# Patient Record
Sex: MALE | Race: WHITE | URBAN - METROPOLITAN AREA
[De-identification: names, ages, dates, MRNs, and addresses within clinical notes are randomized per-mention and may not be internally consistent; named-entity substitution may affect disease eponyms.]

---

## 2021-01-07 ENCOUNTER — INPATIENT (INPATIENT)
Facility: HOSPITAL | Age: 86
LOS: 6 days | Discharge: NURSING, REHAB OR LONG-TERM CARE | DRG: 281 | End: 2021-01-14
Attending: HOSPITALIST | Admitting: HOSPITALIST
Payer: MEDICARE

## 2021-01-07 VITALS
OXYGEN SATURATION: 98 % | RESPIRATION RATE: 18 BRPM | HEIGHT: 69 IN | WEIGHT: 229.94 LBS | DIASTOLIC BLOOD PRESSURE: 109 MMHG | TEMPERATURE: 98 F | HEART RATE: 73 BPM | SYSTOLIC BLOOD PRESSURE: 193 MMHG

## 2021-01-07 LAB
ALBUMIN SERPL ELPH-MCNC: 4 G/DL — SIGNIFICANT CHANGE UP (ref 3.4–5)
ALP SERPL-CCNC: 99 U/L — SIGNIFICANT CHANGE UP (ref 40–120)
ALT FLD-CCNC: 21 U/L — SIGNIFICANT CHANGE UP (ref 12–42)
ANION GAP SERPL CALC-SCNC: 12 MMOL/L — SIGNIFICANT CHANGE UP (ref 9–16)
APPEARANCE UR: SIGNIFICANT CHANGE UP
APTT BLD: 29.5 SEC — SIGNIFICANT CHANGE UP (ref 27.5–35.5)
AST SERPL-CCNC: 26 U/L — SIGNIFICANT CHANGE UP (ref 15–37)
BASOPHILS # BLD AUTO: 0 K/UL — SIGNIFICANT CHANGE UP (ref 0–0.2)
BASOPHILS NFR BLD AUTO: 0 % — SIGNIFICANT CHANGE UP (ref 0–2)
BILIRUB SERPL-MCNC: 0.8 MG/DL — SIGNIFICANT CHANGE UP (ref 0.2–1.2)
BILIRUB UR-MCNC: NEGATIVE — SIGNIFICANT CHANGE UP
BUN SERPL-MCNC: 52 MG/DL — HIGH (ref 7–23)
CALCIUM SERPL-MCNC: 9.2 MG/DL — SIGNIFICANT CHANGE UP (ref 8.5–10.5)
CHLORIDE SERPL-SCNC: 112 MMOL/L — HIGH (ref 96–108)
CO2 SERPL-SCNC: 24 MMOL/L — SIGNIFICANT CHANGE UP (ref 22–31)
COLOR SPEC: YELLOW — SIGNIFICANT CHANGE UP
CREAT SERPL-MCNC: 1.67 MG/DL — HIGH (ref 0.5–1.3)
DIFF PNL FLD: ABNORMAL
EOSINOPHIL # BLD AUTO: 0.14 K/UL — SIGNIFICANT CHANGE UP (ref 0–0.5)
EOSINOPHIL NFR BLD AUTO: 1 % — SIGNIFICANT CHANGE UP (ref 0–6)
ETHANOL SERPL-MCNC: <3 MG/DL — SIGNIFICANT CHANGE UP
GLUCOSE SERPL-MCNC: 168 MG/DL — HIGH (ref 70–99)
GLUCOSE UR QL: NEGATIVE — SIGNIFICANT CHANGE UP
HCT VFR BLD CALC: 42.6 % — SIGNIFICANT CHANGE UP (ref 39–50)
HGB BLD-MCNC: 14.2 G/DL — SIGNIFICANT CHANGE UP (ref 13–17)
INR BLD: 1.09 — SIGNIFICANT CHANGE UP (ref 0.88–1.16)
KETONES UR-MCNC: NEGATIVE — SIGNIFICANT CHANGE UP
LACTATE SERPL-SCNC: 1.2 MMOL/L — SIGNIFICANT CHANGE UP (ref 0.4–2)
LACTATE SERPL-SCNC: 2.2 MMOL/L — HIGH (ref 0.4–2)
LEUKOCYTE ESTERASE UR-ACNC: NEGATIVE — SIGNIFICANT CHANGE UP
LYMPHOCYTES # BLD AUTO: 0.14 K/UL — LOW (ref 1–3.3)
LYMPHOCYTES # BLD AUTO: 1 % — LOW (ref 13–44)
MCHC RBC-ENTMCNC: 31.3 PG — SIGNIFICANT CHANGE UP (ref 27–34)
MCHC RBC-ENTMCNC: 33.3 GM/DL — SIGNIFICANT CHANGE UP (ref 32–36)
MCV RBC AUTO: 94 FL — SIGNIFICANT CHANGE UP (ref 80–100)
MONOCYTES # BLD AUTO: 0.84 K/UL — SIGNIFICANT CHANGE UP (ref 0–0.9)
MONOCYTES NFR BLD AUTO: 6 % — SIGNIFICANT CHANGE UP (ref 2–14)
NEUTROPHILS # BLD AUTO: 12.83 K/UL — HIGH (ref 1.8–7.4)
NEUTROPHILS NFR BLD AUTO: 92 % — HIGH (ref 43–77)
NITRITE UR-MCNC: NEGATIVE — SIGNIFICANT CHANGE UP
NRBC # BLD: SIGNIFICANT CHANGE UP /100 WBCS (ref 0–0)
NT-PROBNP SERPL-SCNC: HIGH PG/ML
PH UR: 5.5 — SIGNIFICANT CHANGE UP (ref 5–8)
PLATELET # BLD AUTO: 184 K/UL — SIGNIFICANT CHANGE UP (ref 150–400)
POTASSIUM SERPL-MCNC: 3.8 MMOL/L — SIGNIFICANT CHANGE UP (ref 3.5–5.3)
POTASSIUM SERPL-SCNC: 3.8 MMOL/L — SIGNIFICANT CHANGE UP (ref 3.5–5.3)
PROT SERPL-MCNC: 8 G/DL — SIGNIFICANT CHANGE UP (ref 6.4–8.2)
PROT UR-MCNC: 100 MG/DL
PROTHROM AB SERPL-ACNC: 12.8 SEC — SIGNIFICANT CHANGE UP (ref 10.6–13.6)
RBC # BLD: 4.53 M/UL — SIGNIFICANT CHANGE UP (ref 4.2–5.8)
RBC # FLD: 13.4 % — SIGNIFICANT CHANGE UP (ref 10.3–14.5)
SARS-COV-2 RNA SPEC QL NAA+PROBE: SIGNIFICANT CHANGE UP
SODIUM SERPL-SCNC: 148 MMOL/L — HIGH (ref 132–145)
SP GR SPEC: 1.02 — SIGNIFICANT CHANGE UP (ref 1–1.03)
TROPONIN I SERPL-MCNC: 0.24 NG/ML — HIGH (ref 0.02–0.06)
TROPONIN I SERPL-MCNC: 0.4 NG/ML — HIGH (ref 0.02–0.06)
UROBILINOGEN FLD QL: 0.2 E.U./DL — SIGNIFICANT CHANGE UP
WBC # BLD: 13.95 K/UL — HIGH (ref 3.8–10.5)
WBC # FLD AUTO: 13.95 K/UL — HIGH (ref 3.8–10.5)

## 2021-01-07 PROCEDURE — 71045 X-RAY EXAM CHEST 1 VIEW: CPT | Mod: 26

## 2021-01-07 PROCEDURE — 99285 EMERGENCY DEPT VISIT HI MDM: CPT

## 2021-01-07 PROCEDURE — 70450 CT HEAD/BRAIN W/O DYE: CPT | Mod: 26,MH

## 2021-01-07 RX ORDER — AZITHROMYCIN 500 MG/1
500 TABLET, FILM COATED ORAL ONCE
Refills: 0 | Status: COMPLETED | OUTPATIENT
Start: 2021-01-07 | End: 2021-01-07

## 2021-01-07 RX ORDER — SODIUM CHLORIDE 9 MG/ML
1000 INJECTION INTRAMUSCULAR; INTRAVENOUS; SUBCUTANEOUS ONCE
Refills: 0 | Status: COMPLETED | OUTPATIENT
Start: 2021-01-07 | End: 2021-01-07

## 2021-01-07 RX ORDER — LABETALOL HCL 100 MG
100 TABLET ORAL ONCE
Refills: 0 | Status: DISCONTINUED | OUTPATIENT
Start: 2021-01-07 | End: 2021-01-07

## 2021-01-07 RX ORDER — LABETALOL HCL 100 MG
10 TABLET ORAL ONCE
Refills: 0 | Status: DISCONTINUED | OUTPATIENT
Start: 2021-01-07 | End: 2021-01-07

## 2021-01-07 RX ORDER — AMLODIPINE BESYLATE 2.5 MG/1
5 TABLET ORAL ONCE
Refills: 0 | Status: COMPLETED | OUTPATIENT
Start: 2021-01-07 | End: 2021-01-07

## 2021-01-07 RX ORDER — ASPIRIN/CALCIUM CARB/MAGNESIUM 324 MG
325 TABLET ORAL ONCE
Refills: 0 | Status: COMPLETED | OUTPATIENT
Start: 2021-01-07 | End: 2021-01-07

## 2021-01-07 RX ORDER — CEFTRIAXONE 500 MG/1
1000 INJECTION, POWDER, FOR SOLUTION INTRAMUSCULAR; INTRAVENOUS ONCE
Refills: 0 | Status: COMPLETED | OUTPATIENT
Start: 2021-01-07 | End: 2021-01-07

## 2021-01-07 RX ADMIN — SODIUM CHLORIDE 1000 MILLILITER(S): 9 INJECTION INTRAMUSCULAR; INTRAVENOUS; SUBCUTANEOUS at 16:52

## 2021-01-07 RX ADMIN — CEFTRIAXONE 100 MILLIGRAM(S): 500 INJECTION, POWDER, FOR SOLUTION INTRAMUSCULAR; INTRAVENOUS at 17:44

## 2021-01-07 RX ADMIN — AZITHROMYCIN 255 MILLIGRAM(S): 500 TABLET, FILM COATED ORAL at 23:15

## 2021-01-07 NOTE — ED PROVIDER NOTE - CARE PLAN
Principal Discharge DX:	Fall from standing, initial encounter  Secondary Diagnosis:	Alcoholic intoxication without complication

## 2021-01-07 NOTE — ED ADULT NURSE REASSESSMENT NOTE - NS ED NURSE REASSESS COMMENT FT1
Pt refusing to take blood pressure medication. Pt keeps stating, "I'm old. I'm old. I'm 33." Attempted to use [place] . Pt only repeating himself and yelling over . Safety and comfort measures maintained.

## 2021-01-07 NOTE — H&P ADULT - PROBLEM SELECTOR PLAN 3
SBP 190s/100s upon arrival to [facility], improved to 130s/90s after Norvasc 5mg PO x 1 dose and 1 liter urine retrieved from straight cath.  --will continue Norvasc 5mg PO daily. SBP 190s/100s upon arrival to [facility], improved to 130s/90s after Norvasc 5mg PO x 1 dose and 1 liter urine retrieved from straight cath.  --will start Metoprolol Tartrate 25mg PO BID.

## 2021-01-07 NOTE — H&P ADULT - MENTAL STATUS
alert and oriented x 1 (only to self), not to place or time, poor memory and concentration noted alert and oriented x 1 (only to self/date of birth), not to place or time, poor memory and concentration noted

## 2021-01-07 NOTE — H&P ADULT - NSHPLABSRESULTS_GEN_ALL_CORE
EKG SR with PAC's no acute ischemic changes EKG revealed NSR@97BPM with PVC noted, no acute ischemic changes. Initial EKG@[facility] SR@97BPM with PVC noted, no acute ischemic changes  Repeat EKG@[facility] SR@85BPM with PACs and biphasic TW V2-V3 consistent with Wellens sign

## 2021-01-07 NOTE — H&P ADULT - PROBLEM SELECTOR PLAN 2
Initial Troponin I 0.241 and second set 0.395.  --will obtain STAT Troponin T, CK, CKMB and repeat in AM.  --continue cardiac telemetry and obtain Echo in AM. Initial Troponin I 0.241 and second set 0.395.  --Patient chest pain free, denies cardiac history.  --will obtain STAT Troponin T, CK, CKMB and repeat in AM.  --continue cardiac telemetry and obtain Echo in AM. Initial EKG@[facility] without acute changes, repeat EKG@[facility] SR@85BPM with PACs and biphasic TW V2-V3 consistent with Wellens sign.   --Troponin I 0.241 and second set Troponin I 0.395.  --Troponin T 0.07, CKMB 9.7, , R/I NSTEMI, started on Heparin gtt per ACS protocol and loaded with Plavix 300mg PO x 1 dose.   --Continue medical management with ASA/Plavix/Statin.  --Obtain Echocardiogram in AM. Initial EKG@[facility] without acute changes, repeat EKG@[facility] SR@85BPM with PACs and biphasic TW V2-V3 consistent with Wellens sign.   --Troponin I 0.241 and second set Troponin I 0.395.  --Troponin T 0.07, CKMB 9.7, , R/I NSTEMI, started on Heparin gtt per ACS protocol and loaded with Plavix 300mg PO x 1 dose as discussed with CCU fellow Dr. Higgins.  --Continue medical management with ASA/Plavix/Statin.  --Obtain Echocardiogram in AM.

## 2021-01-07 NOTE — ED PROVIDER NOTE - PHYSICAL EXAMINATION
VITAL SIGNS: I have reviewed nursing notes and confirm.  CONSTITUTIONAL: Well-developed elderly man comfortable in stretcher, mildly disheveled smelling of alcohol, at times not following commands and at times speaking strongly in english making his demands known; in no acute distress.  SKIN: Skin exam is warm and dry, no acute rash.  HEAD: Normocephalic; atraumatic.  EYES: PERRL, EOM intact; conjunctiva and sclera clear.  ENT: No nasal discharge; airway clear.  NECK: Supple; non tender.  CARD: S1, S2 normal; no murmurs, gallops, or rubs. Regular rate and rhythm.  RESP: Unlabored. No wheezes, rales or rhonchi.  ABD: soft; non-distended; non-tender  EXT: Normal ROM. No cyanosis or edema. Non-ttp all ext, distal pulses intact, + poor foot hygiene   NEURO: Alert, oriented to self, no facial asymmetry, symmetric , gait deferred, no appreciable sensory deficits, non-cooperative with most of neuro exam  PSYCH: + intoxicated

## 2021-01-07 NOTE — ED ADULT NURSE NOTE - NSFALLRSKASSESSTYPE_ED_ALL_ED
Detail Level: Zone
Comment: Overall patient greatly improved although currently flaring today likely due to recent nail salon appointment.  States medications improve rash after a  few days. \\nDiscussed chronic and recurrent nature \\nDiscussed use of cerave cream
Initial (On Arrival)

## 2021-01-07 NOTE — H&P ADULT - PROBLEM SELECTOR PLAN 1
witnessed fall by bystanders on street, patient denies prodromal symptoms.   --EKG revealed SR with PACs, CT head negative for acute findings. witnessed fall by bystanders on street, patient denies prodromal symptoms.   --CT head negative for acute findings.

## 2021-01-07 NOTE — ED PROVIDER NOTE - CLINICAL SUMMARY MEDICAL DECISION MAKING FREE TEXT BOX
+ AMS, poor historian, meeting sepsis criteria, no clear source though not exhibiting signs of meningismus, spinal tap deferred at this time. Hemodynamically stable and suitable for RMF. Given pt's elevated BNP, high risk for acute pulmonary edema, so fluids given were less than 30cc/kg in order to protect pt's cardiopulmonary function.

## 2021-01-07 NOTE — ED ADULT NURSE NOTE - CHIEF COMPLAINT QUOTE
BIBA for a witnessed fall by bystanders as per EMS. Pt appears confused, denies alcohol use. No signs of trauma. Pt ambulates with a walker. Per EMS, pt could barely ambulate in the field.

## 2021-01-07 NOTE — H&P ADULT - PROBLEM SELECTOR PLAN 4
BUN/Cr 52/1.67, baseline Cr unknown.  --UA large blood, >10 RBC, bacteria present, negative nitrite/LE. Will obtain Urine electrolytes.  --Noted to have urinary retention at [facility] with 1L retrieved during straight cath, will repeat bladder scan.

## 2021-01-07 NOTE — ED PROVIDER NOTE - ATTENDING CONTRIBUTION TO CARE
I have seen the pt, reviewed all pertinent clinical data, and I agree with the documentation/care/plan executed by PARRISH Cox.

## 2021-01-07 NOTE — H&P ADULT - PROBLEM SELECTOR PLAN 5
afebrile, WBC 13.95 with slight left shift, Lactate 2.2 with improvement 1.2 after IVFs.  --CXR without acute infiltrates, UA large blood, >10 RBC, bacteria present, negative nitrite/LE, Blood cultures pending. afebrile, WBC 13.95 with slight left shift, Lactate 2.2 with improvement 1.2 after IVFs.  --CXR without acute infiltrates, COVID PCR negative, UA: large blood, >10 RBC, bacteria present, negative nitrite/LE and Blood cultures drawn-- results pending.  --received Azithromycin 500mg IV x 1 dose and Ceftriaxone 1g IV x 1 dose at [facility], will continue to monitor for now and discuss need for continuation of antibiotics in AM.

## 2021-01-07 NOTE — ED ADULT TRIAGE NOTE - CHIEF COMPLAINT QUOTE
BIBA for a witness fall by bystanders as per EMS. Pt appears confused, denies alcohol use. No signs of trauma. Pt ambulates with a walker. Per EMS, pt could barely ambulate in the field. BIBA for a witnessed fall by bystanders as per EMS. Pt appears confused, denies alcohol use. No signs of trauma. Pt ambulates with a walker. Per EMS, pt could barely ambulate in the field.

## 2021-01-07 NOTE — ED ADULT NURSE REASSESSMENT NOTE - NS ED NURSE REASSESS COMMENT FT1
Pt straight-cath for urinalysis using sterile technique. Pt tolerated procedure well, 1000ml of urine retrieved, jeff colored. Sample sent. Pt A&Ox1, oriented to person. BP post-straight cath 163/83.

## 2021-01-07 NOTE — H&P ADULT - ASSESSMENT
89 yr old Montserratian speaking M, hx of ETOH abuse, POOR HISTORIAN, BIBEMS to [facility] 1/7/20 s/p witnessed fall by bystanders on the street, patient denies complaints. EKG SR with PAC's no acute ischemic changes, CT head negative, Troponin I 0.241 and second set 0.395, now transferred to [facility] for cardiac telemetry and serial cardiac enzymes.    89 yr old [place]/English speaking M, hx of ETOH abuse, POOR HISTORIAN, BIBEMS to [facility] 1/7/20 s/p witnessed fall by bystanders on the street, patient denies complaints. EKG NSR with PVC noted, no acute ischemic changes, CT head negative, Troponin I 0.241 and second set 0.395, now transferred to [facility] for cardiac telemetry and serial cardiac enzymes.    89 yr old Filipino/English speaking M, hx of ETOH abuse, POOR HISTORIAN, BIBEMS to [facility] 1/7/20 s/p witnessed fall by bystanders on the street, initial EKG SR without acute ischemic changes, repeat EKG@[facility] SR with biphasic TW V2-V3 (consistent with Wellen's sign), Troponin T 0.07, CKMB 9.7, . Patient stable admitted to 5LA telemetry for medical management of NSTEMI.

## 2021-01-07 NOTE — H&P ADULT - PROBLEM SELECTOR PLAN 6
Alcohol level <3, will initiate CIWA protocol.  --continue Thiamine 100mg PO daily.    VTE PPx: Lovenox Alcohol level <3, will initiate CIWA protocol.  --continue Thiamine 100mg PO daily, MVI and Folate.    VTE PPx: Lovenox Alcohol level <3, will initiate CIWA protocol.  --continue Thiamine 100mg PO daily, MVI and Folate.    VTE PPx: on Heparin gtt  PT consulted: F/U recs

## 2021-01-07 NOTE — ED ADULT NURSE NOTE - OBJECTIVE STATEMENT
patient BIBA for AMS and fall; found sitting on ground with walker; slurred speech, rambling; unsure of what language patient speaks; able to get out 'mouth dry  and need to pee"; given urinal and vaseline placed on lips; iv established and labs drawn/sent

## 2021-01-07 NOTE — ED ADULT NURSE REASSESSMENT NOTE - NS ED NURSE REASSESS COMMENT FT1
Rcvd pt from [facility]ASMY[facility]cl[facility]. Pt in stretcher with side rails up. Pt pulled IV out, bandage applied, and provider aware. Pt repeat BP improved. Provided with water and urinal. Will medicate as ordered.

## 2021-01-07 NOTE — H&P ADULT - NSHPSOCIALHISTORY_GEN_ALL_CORE
Tobacco/illicit drugs: denies  ETOH: [facility] with hx of ETOH abuse and had beer earlier today.

## 2021-01-07 NOTE — H&P ADULT - ATTENDING COMMENTS
Initial attending contact date 1/8/21 on morning bedside rounds   . See attending addendum to HPI for initial attending contact documentation.  See PA note written above, for details. I reviewed the PA documentation.  I have personally seen and examined this patient today. I reviewed vitals, labs, medications, cardiac studies and additional imaging.  I agree with the PA's findings and plans as written above with the following additions/amendments:  Patient with h/o ETOH abuse   Presented as transfer from [facility] with witnessed fall, elevated troponin  Currently HDS, asx. unable to consent for ischemic evaluation given lack of capacity  Plan for:  DAPT with ASA/ Plavix   Lopressor 25 BID  48hr heparin gtt  Atorva 80 qHS  MVI, thiamine, folate  Obtain TTE for cardiac structural assessment  PT eval given falls and h/o ETOH abuse  TOÑITO Spicer.  Cardiology Attending

## 2021-01-07 NOTE — H&P ADULT - HISTORY OF PRESENT ILLNESS
89 yr old Vincentian speaking M, hx of ETOH abuse, POOR HISTORIAN, BIBEMS to [facility] 1/7/20 s/p witnessed fall by bystanders on the street. Interview conducted with  service, pt denying pain, only endorsing dry mouth and urge to pee. Patient denied any prodromal dizziness, palpitations, chest pain, SOB, headache, visual changes or loss of sensation. Unable to obtain further information. Per EMS, no reported LOC. Unknown if pt struck his head. Pt reportedly lives on [facility].    In [facility], BP: 193/109, HR: 73, RR:18, Temp: 98.5F, O2 sat: 98% RA, Fingerstick 154.  EKG revealed NSR  CXR unremarkable. CT head revealed no acute intracranial hemorrhage, mass effect or demarcated territorial infarction. Chronic microvessel ischemic changes.  Labs notable for: Alcohol level <3, WBC 13.95 with slight left shift, Na 148, BUN/Cr 52/1.67, Lactate 2.2 with repeat level 1.2 (after IVF bolus), BNP 11,364, Initial Troponin I 0.241 and second set 0.395.  UA: large blood, >10 RBC, bacteria present, negative nitrite/LE. Blood cultures pending.  Patient treated with 2 liter IVF bolus, Azithromycin 500mg IV x 1 dose, Ceftriaxone 1g IV x 1 dose, Norvasc 5mg PO x 1 dose and ASA 325mg PO x 1 dose.  Patient was straight cathed for urinary retention with ~1L of urine retrieved.  Patient stable, now transferred to [facility] for cardiac telemetry, serial cardiac enzymes and further ischemic work-up.      ****INCOMPLETE    89 yr old Kyrgyz speaking M, hx of ETOH abuse, POOR HISTORIAN, BIBEMS to [facility] 1/7/20 s/p witnessed fall by bystanders on the street. Interview conducted with  service, pt denying pain, only endorsing dry mouth and urge to pee. Patient denied any prodromal dizziness, palpitations, chest pain, SOB, headache, visual changes or loss of sensation. Unable to obtain further information. Per EMS, no reported LOC. Unknown if pt struck his head. Pt reportedly lives on [facility].    In [facility], BP: 193/109, HR: 73, RR:18, Temp: 98.5F, O2 sat: 98% RA, Fingerstick 154.  EKG revealed SR with PAC's no acute ischemic changes.  CXR unremarkable. CT head revealed no acute intracranial hemorrhage, mass effect or demarcated territorial infarction. Chronic microvessel ischemic changes.  Labs notable for: Alcohol level <3, WBC 13.95 with slight left shift, Na 148, BUN/Cr 52/1.67, Lactate 2.2 with repeat level 1.2 (after IVF bolus), BNP 11,364, Initial Troponin I 0.241 and second set 0.395.  UA: large blood, >10 RBC, bacteria present, negative nitrite/LE. Blood cultures pending.  Patient treated with 2 liter IVF bolus, Azithromycin 500mg IV x 1 dose, Ceftriaxone 1g IV x 1 dose, Norvasc 5mg PO x 1 dose and ASA 325mg PO x 1 dose.  Patient was straight cathed for urinary retention with ~1L of urine retrieved.  [facility] stable[facility] cardiac enzymes and further ischemic work-up.      ****INCOMPLETE    89 yr old Hebrew speaking M, hx of ETOH abuse, POOR HISTORIAN, BIBEMS to [facility] 1/7/20 s/p witnessed fall by bystanders on the street. Interview conducted with  service, pt denying pain, only endorsing dry mouth and urge to pee. Patient denied any prodromal dizziness, palpitations, chest pain, SOB, headache, visual changes or loss of sensation. Unable to obtain further information. Per EMS, no reported LOC. Unknown if pt struck his head. Pt reportedly lives on [facility].    In [facility], BP: 193/109, HR: 73, RR:18, Temp: 98.5F, O2 sat: 98% RA, Fingerstick 154.  EKG revealed SR with PAC's no acute ischemic changes.  CXR . CT head revealed no acute intracranial hemorrhage, mass effect or demarcated territorial infarction. Chronic microvessel ischemic changes.  Labs notable for: Alcohol level <3, WBC 13.95 with slight left shift, Na 148, BUN/Cr 52/1.67, Lactate 2.2 with repeat level 1.2 (after IVF bolus), BNP 11,364, Initial Troponin I 0.241 and second set 0.395.  UA: large blood, >10 RBC, bacteria present, negative nitrite/LE. Blood cultures pending.  Patient treated with 2 liter IVF bolus, Azithromycin 500mg IV x 1 dose, Ceftriaxone 1g IV x 1 dose, Norvasc 5mg PO x 1 dose and ASA 325mg PO x 1 dose.  Patient was straight cathed for urinary retention with ~1L of urine retrieved.  Patient stable, now transferred to [facility] for cardiac telemetry, serial cardiac enzymes and further ischemic work-up.      89 yr old Mexican/English speaking M, hx of ETOH abuse, POOR HISTORIAN, BIBEMS to [facility] 1/7/20 s/p witnessed fall by bystanders. Patient reportedly was walking in the street with his walker when he suddenly collapsed to the ground. Patient denied any prodromal dizziness, palpitations, chest pain, SOB, headache, visual changes or loss of sensation. Per EMS, no reported LOC and unknown if pt struck his head. Patient denies any current pain, only endorsing dry mouth and urge to urinate. Pt reportedly lives alone in an apartment and denies having ETOH today. Unable to obtain further information as patient repeatedly only states "I'm old, I don't know."  In [facility], BP: 193/109, HR: 73, RR:18, Temp: 98.5F, O2 sat: 98% RA, Fingerstick 154. EKG revealed NSR@97BPM with PVC noted, no acute ischemic changes. CXR unremarkable. CT head revealed no acute intracranial hemorrhage, mass effect or demarcated territorial infarction. Chronic microvessel ischemic changes. Labs notable for: Alcohol level <3, WBC 13.95 with slight left shift, Na 148, BUN/Cr 52/1.67, Lactate 2.2 with repeat level 1.2 (after IVF bolus), BNP 11,364, Initial Troponin I 0.241 and second set 0.395. UA: large blood, >10 RBC, bacteria present, negative nitrite/LE. Blood cultures drawn- pending. COVID PCR negative.  Patient treated with 2 liter IVF bolus, Azithromycin 500mg IV x 1 dose, Ceftriaxone 1g IV x 1 dose, Norvasc 5mg PO x 1 dose and ASA 325mg PO x 1 dose.  Patient was straight cathed for urinary retention with ~1L of urine retrieved.  Patient stable, now transferred to [facility] for cardiac telemetry, serial cardiac enzymes and further ischemic work-up.

## 2021-01-07 NOTE — ED PROVIDER NOTE - OBJECTIVE STATEMENT
89 y/o M BIBEMS for fall on the street, + etoh (drank beer today), Cymraes speaking, interview conducted with  service, pt denying pain, only endorsing dry mouth and urge to pee. No CP/SOB. Unable to obtain further information. Per EMS, no reported LOC. Unknown if pt struck his head. Pt reportedly lives on [facility].

## 2021-01-08 DIAGNOSIS — N17.9 ACUTE KIDNEY FAILURE, UNSPECIFIED: ICD-10-CM
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
## 2021-01-08 DIAGNOSIS — R77.8 OTHER SPECIFIED ABNORMALITIES OF PLASMA PROTEINS: ICD-10-CM

## 2021-01-08 DIAGNOSIS — I21.4 NON-ST ELEVATION (NSTEMI) MYOCARDIAL INFARCTION: ICD-10-CM

## 2021-01-08 DIAGNOSIS — D72.829 ELEVATED WHITE BLOOD CELL COUNT, UNSPECIFIED: ICD-10-CM

## 2021-01-08 DIAGNOSIS — I10 ESSENTIAL (PRIMARY) HYPERTENSION: ICD-10-CM

## 2021-01-08 DIAGNOSIS — R33.9 RETENTION OF URINE, UNSPECIFIED: ICD-10-CM

## 2021-01-08 DIAGNOSIS — F10.10 ALCOHOL ABUSE, UNCOMPLICATED: ICD-10-CM

## 2021-01-08 LAB
A1C WITH ESTIMATED AVERAGE GLUCOSE RESULT: 5.3 % — SIGNIFICANT CHANGE UP (ref 4–5.6)
ALBUMIN SERPL ELPH-MCNC: 3.2 G/DL — LOW (ref 3.3–5)
ALP SERPL-CCNC: 74 U/L — SIGNIFICANT CHANGE UP (ref 40–120)
ALT FLD-CCNC: 12 U/L — SIGNIFICANT CHANGE UP (ref 10–45)
AMPHET UR-MCNC: NEGATIVE — SIGNIFICANT CHANGE UP
ANION GAP SERPL CALC-SCNC: 11 MMOL/L — SIGNIFICANT CHANGE UP (ref 5–17)
APTT BLD: 73.8 SEC — HIGH (ref 27.5–35.5)
APTT BLD: 83.3 SEC — HIGH (ref 27.5–35.5)
AST SERPL-CCNC: 31 U/L — SIGNIFICANT CHANGE UP (ref 10–40)
BARBITURATES UR SCN-MCNC: NEGATIVE — SIGNIFICANT CHANGE UP
BENZODIAZ UR-MCNC: NEGATIVE — SIGNIFICANT CHANGE UP
BILIRUB DIRECT SERPL-MCNC: <0.2 MG/DL — SIGNIFICANT CHANGE UP (ref 0–0.2)
BILIRUB INDIRECT FLD-MCNC: SIGNIFICANT CHANGE UP (ref 0.2–1)
BILIRUB SERPL-MCNC: 0.8 MG/DL — SIGNIFICANT CHANGE UP (ref 0.2–1.2)
BLD GP AB SCN SERPL QL: NEGATIVE — SIGNIFICANT CHANGE UP
BUN SERPL-MCNC: 41 MG/DL — HIGH (ref 7–23)
CALCIUM SERPL-MCNC: 8.2 MG/DL — LOW (ref 8.4–10.5)
CHLORIDE SERPL-SCNC: 112 MMOL/L — HIGH (ref 96–108)
CHLORIDE UR-SCNC: 76 MMOL/L — SIGNIFICANT CHANGE UP
CHOLEST SERPL-MCNC: 140 MG/DL — SIGNIFICANT CHANGE UP
CK MB CFR SERPL CALC: 8.6 NG/ML — HIGH (ref 0–6.7)
CK MB CFR SERPL CALC: 9.7 NG/ML — HIGH (ref 0–6.7)
CK SERPL-CCNC: 630 U/L — HIGH (ref 30–200)
CK SERPL-CCNC: 657 U/L — HIGH (ref 30–200)
CO2 SERPL-SCNC: 22 MMOL/L — SIGNIFICANT CHANGE UP (ref 22–31)
COCAINE METAB.OTHER UR-MCNC: NEGATIVE — SIGNIFICANT CHANGE UP
CREAT ?TM UR-MCNC: 41 MG/DL — SIGNIFICANT CHANGE UP
CREAT SERPL-MCNC: 1.42 MG/DL — HIGH (ref 0.5–1.3)
CULTURE RESULTS: NO GROWTH — SIGNIFICANT CHANGE UP
ESTIMATED AVERAGE GLUCOSE: 105 MG/DL — SIGNIFICANT CHANGE UP (ref 68–114)
GLUCOSE SERPL-MCNC: 91 MG/DL — SIGNIFICANT CHANGE UP (ref 70–99)
HCT VFR BLD CALC: 33.7 % — LOW (ref 39–50)
HCT VFR BLD CALC: 34.8 % — LOW (ref 39–50)
HDLC SERPL-MCNC: 62 MG/DL — SIGNIFICANT CHANGE UP
HGB BLD-MCNC: 10.7 G/DL — LOW (ref 13–17)
HGB BLD-MCNC: 11.4 G/DL — LOW (ref 13–17)
LIPID PNL WITH DIRECT LDL SERPL: 70 MG/DL — SIGNIFICANT CHANGE UP
MAGNESIUM SERPL-MCNC: 2 MG/DL — SIGNIFICANT CHANGE UP (ref 1.6–2.6)
MCHC RBC-ENTMCNC: 30.4 PG — SIGNIFICANT CHANGE UP (ref 27–34)
MCHC RBC-ENTMCNC: 31.8 GM/DL — LOW (ref 32–36)
MCHC RBC-ENTMCNC: 31.8 PG — SIGNIFICANT CHANGE UP (ref 27–34)
MCHC RBC-ENTMCNC: 32.8 GM/DL — SIGNIFICANT CHANGE UP (ref 32–36)
MCV RBC AUTO: 95.7 FL — SIGNIFICANT CHANGE UP (ref 80–100)
MCV RBC AUTO: 97.2 FL — SIGNIFICANT CHANGE UP (ref 80–100)
METHADONE UR-MCNC: NEGATIVE — SIGNIFICANT CHANGE UP
NON HDL CHOLESTEROL: 78 MG/DL — SIGNIFICANT CHANGE UP
NRBC # BLD: 0 /100 WBCS — SIGNIFICANT CHANGE UP (ref 0–0)
NRBC # BLD: 0 /100 WBCS — SIGNIFICANT CHANGE UP (ref 0–0)
OPIATES UR-MCNC: NEGATIVE — SIGNIFICANT CHANGE UP
OSMOLALITY UR: 442 MOSM/KG — SIGNIFICANT CHANGE UP (ref 300–900)
PCP SPEC-MCNC: SIGNIFICANT CHANGE UP
PCP UR-MCNC: NEGATIVE — SIGNIFICANT CHANGE UP
PHOSPHATE 24H UR-MCNC: 35.7 MG/DL — SIGNIFICANT CHANGE UP
PLATELET # BLD AUTO: 154 K/UL — SIGNIFICANT CHANGE UP (ref 150–400)
PLATELET # BLD AUTO: 157 K/UL — SIGNIFICANT CHANGE UP (ref 150–400)
POTASSIUM SERPL-MCNC: 3.6 MMOL/L — SIGNIFICANT CHANGE UP (ref 3.5–5.3)
POTASSIUM SERPL-SCNC: 3.6 MMOL/L — SIGNIFICANT CHANGE UP (ref 3.5–5.3)
POTASSIUM UR-SCNC: 26 MMOL/L — SIGNIFICANT CHANGE UP
PROT ?TM UR-MCNC: 75 MG/DL — HIGH (ref 0–12)
PROT SERPL-MCNC: 5.8 G/DL — LOW (ref 6–8.3)
RBC # BLD: 3.52 M/UL — LOW (ref 4.2–5.8)
RBC # BLD: 3.58 M/UL — LOW (ref 4.2–5.8)
RBC # FLD: 13.7 % — SIGNIFICANT CHANGE UP (ref 10.3–14.5)
RBC # FLD: 13.8 % — SIGNIFICANT CHANGE UP (ref 10.3–14.5)
RH IG SCN BLD-IMP: POSITIVE — SIGNIFICANT CHANGE UP
SARS-COV-2 IGG SERPL QL IA: NEGATIVE — SIGNIFICANT CHANGE UP
SARS-COV-2 IGM SERPL IA-ACNC: 0.07 INDEX — SIGNIFICANT CHANGE UP
SODIUM SERPL-SCNC: 145 MMOL/L — SIGNIFICANT CHANGE UP (ref 135–145)
SODIUM UR-SCNC: 78 MMOL/L — SIGNIFICANT CHANGE UP
SPECIMEN SOURCE: SIGNIFICANT CHANGE UP
THC UR QL: NEGATIVE — SIGNIFICANT CHANGE UP
TRIGL SERPL-MCNC: 41 MG/DL — SIGNIFICANT CHANGE UP
TROPONIN T SERPL-MCNC: 0.06 NG/ML — CRITICAL HIGH (ref 0–0.01)
TROPONIN T SERPL-MCNC: 0.07 NG/ML — CRITICAL HIGH (ref 0–0.01)
UUN UR-MCNC: 612 MG/DL — SIGNIFICANT CHANGE UP
WBC # BLD: 7.8 K/UL — SIGNIFICANT CHANGE UP (ref 3.8–10.5)
WBC # BLD: 8.69 K/UL — SIGNIFICANT CHANGE UP (ref 3.8–10.5)
WBC # FLD AUTO: 7.8 K/UL — SIGNIFICANT CHANGE UP (ref 3.8–10.5)
WBC # FLD AUTO: 8.69 K/UL — SIGNIFICANT CHANGE UP (ref 3.8–10.5)

## 2021-01-08 PROCEDURE — 93306 TTE W/DOPPLER COMPLETE: CPT | Mod: 26

## 2021-01-08 PROCEDURE — 99222 1ST HOSP IP/OBS MODERATE 55: CPT

## 2021-01-08 RX ORDER — CLOPIDOGREL BISULFATE 75 MG/1
75 TABLET, FILM COATED ORAL DAILY
Refills: 0 | Status: DISCONTINUED | OUTPATIENT
Start: 2021-01-09 | End: 2021-01-11

## 2021-01-08 RX ORDER — ENOXAPARIN SODIUM 100 MG/ML
40 INJECTION SUBCUTANEOUS DAILY
Refills: 0 | Status: DISCONTINUED | OUTPATIENT
Start: 2021-01-08 | End: 2021-01-08

## 2021-01-08 RX ORDER — FINASTERIDE 5 MG/1
5 TABLET, FILM COATED ORAL DAILY
Refills: 0 | Status: DISCONTINUED | OUTPATIENT
Start: 2021-01-08 | End: 2021-01-14

## 2021-01-08 RX ORDER — THIAMINE MONONITRATE (VIT B1) 100 MG
100 TABLET ORAL DAILY
Refills: 0 | Status: COMPLETED | OUTPATIENT
Start: 2021-01-08 | End: 2021-01-10

## 2021-01-08 RX ORDER — AMLODIPINE BESYLATE 2.5 MG/1
5 TABLET ORAL DAILY
Refills: 0 | Status: DISCONTINUED | OUTPATIENT
Start: 2021-01-08 | End: 2021-01-08

## 2021-01-08 RX ORDER — HEPARIN SODIUM 5000 [USP'U]/ML
5500 INJECTION INTRAVENOUS; SUBCUTANEOUS ONCE
Refills: 0 | Status: COMPLETED | OUTPATIENT
Start: 2021-01-08 | End: 2021-01-08

## 2021-01-08 RX ORDER — ATORVASTATIN CALCIUM 80 MG/1
80 TABLET, FILM COATED ORAL AT BEDTIME
Refills: 0 | Status: DISCONTINUED | OUTPATIENT
Start: 2021-01-08 | End: 2021-01-14

## 2021-01-08 RX ORDER — HEPARIN SODIUM 5000 [USP'U]/ML
INJECTION INTRAVENOUS; SUBCUTANEOUS
Qty: 25000 | Refills: 0 | Status: DISCONTINUED | OUTPATIENT
Start: 2021-01-08 | End: 2021-01-10

## 2021-01-08 RX ORDER — METOPROLOL TARTRATE 50 MG
25 TABLET ORAL
Refills: 0 | Status: DISCONTINUED | OUTPATIENT
Start: 2021-01-08 | End: 2021-01-11

## 2021-01-08 RX ORDER — ASPIRIN/CALCIUM CARB/MAGNESIUM 324 MG
81 TABLET ORAL DAILY
Refills: 0 | Status: DISCONTINUED | OUTPATIENT
Start: 2021-01-08 | End: 2021-01-14

## 2021-01-08 RX ORDER — FOLIC ACID 0.8 MG
1 TABLET ORAL DAILY
Refills: 0 | Status: DISCONTINUED | OUTPATIENT
Start: 2021-01-08 | End: 2021-01-12

## 2021-01-08 RX ORDER — TAMSULOSIN HYDROCHLORIDE 0.4 MG/1
0.4 CAPSULE ORAL AT BEDTIME
Refills: 0 | Status: DISCONTINUED | OUTPATIENT
Start: 2021-01-08 | End: 2021-01-14

## 2021-01-08 RX ORDER — CLOPIDOGREL BISULFATE 75 MG/1
300 TABLET, FILM COATED ORAL ONCE
Refills: 0 | Status: COMPLETED | OUTPATIENT
Start: 2021-01-08 | End: 2021-01-08

## 2021-01-08 RX ORDER — HEPARIN SODIUM 5000 [USP'U]/ML
5500 INJECTION INTRAVENOUS; SUBCUTANEOUS EVERY 6 HOURS
Refills: 0 | Status: DISCONTINUED | OUTPATIENT
Start: 2021-01-08 | End: 2021-01-10

## 2021-01-08 RX ORDER — HEPARIN SODIUM 5000 [USP'U]/ML
2500 INJECTION INTRAVENOUS; SUBCUTANEOUS EVERY 6 HOURS
Refills: 0 | Status: DISCONTINUED | OUTPATIENT
Start: 2021-01-08 | End: 2021-01-10

## 2021-01-08 RX ADMIN — HEPARIN SODIUM 1200 UNIT(S)/HR: 5000 INJECTION INTRAVENOUS; SUBCUTANEOUS at 11:32

## 2021-01-08 RX ADMIN — HEPARIN SODIUM 5500 UNIT(S): 5000 INJECTION INTRAVENOUS; SUBCUTANEOUS at 03:59

## 2021-01-08 RX ADMIN — CLOPIDOGREL BISULFATE 300 MILLIGRAM(S): 75 TABLET, FILM COATED ORAL at 04:02

## 2021-01-08 RX ADMIN — Medication 1 MILLIGRAM(S): at 11:36

## 2021-01-08 RX ADMIN — FINASTERIDE 5 MILLIGRAM(S): 5 TABLET, FILM COATED ORAL at 18:59

## 2021-01-08 RX ADMIN — TAMSULOSIN HYDROCHLORIDE 0.4 MILLIGRAM(S): 0.4 CAPSULE ORAL at 21:31

## 2021-01-08 RX ADMIN — Medication 1 TABLET(S): at 11:36

## 2021-01-08 RX ADMIN — HEPARIN SODIUM 1200 UNIT(S)/HR: 5000 INJECTION INTRAVENOUS; SUBCUTANEOUS at 03:59

## 2021-01-08 RX ADMIN — ATORVASTATIN CALCIUM 80 MILLIGRAM(S): 80 TABLET, FILM COATED ORAL at 21:31

## 2021-01-08 RX ADMIN — Medication 81 MILLIGRAM(S): at 11:36

## 2021-01-08 RX ADMIN — Medication 25 MILLIGRAM(S): at 11:36

## 2021-01-08 RX ADMIN — Medication 25 MILLIGRAM(S): at 21:31

## 2021-01-08 RX ADMIN — Medication 100 MILLIGRAM(S): at 11:36

## 2021-01-08 NOTE — PROGRESS NOTE ADULT - SUBJECTIVE AND OBJECTIVE BOX
CARDIOLOGY NP PROGRESS NOTE      Micronesian ID #[id number]  Subjective:  Received pt awake in bed in NAD. Micronesian  used for which patient did not respond to any questions from . Bedside RN spoke to patient in [place] Rican for which he answered intermittently in English. Patient kept stating name and that he "was old" and "is here". Denies CP/SOB, dizziness/diaphoresis, n/v, palpitations.  Remainder ROS otherwise negative.    Overnight Events:  No acute events overnight     TELEMETRY: SB- SR (HR 50s-70s)        VITAL SIGNS:  T(C): 36.8 (01-08-21 @ 09:15), Max: 37.3 (01-08-21 @ 00:36)  HR: 85 (01-08-21 @ 12:02) (54 - 97)  BP: 163/67 (01-08-21 @ 12:02) (115/55 - 206/97)  RR: 17 (01-08-21 @ 12:02) (15 - 20)  SpO2: 99% (01-08-21 @ 12:02) (94% - 100%)  Wt(kg): --    I&O's Summary    07 Jan 2021 07:01  -  08 Jan 2021 07:00  --------------------------------------------------------  IN: 48 mL / OUT: 1600 mL / NET: -1552 mL    08 Jan 2021 07:01  -  08 Jan 2021 13:53  --------------------------------------------------------  IN: 84 mL / OUT: 400 mL / NET: -316 mL        Micronesian ID #[id number]  PHYSICAL EXAM:    General:  [place]ican/English Speaking. Alert to self and place (hospital). Disoriented to situation and year. No acute distress  Neck: Supple, NO JVD  Cardiac: S1 S2, No M/R/G  Pulmonary: Scattered rhonchi w/non-productive cough. Breathing unlabored on RA  Abdomen: Soft, Non -tender, +BS x 4 quads  Extremities: No Rashes, No edema  Neuro: [place]/English Speaking. Alert to self and place (hospital). Disoriented to situation and year. No focal deficits          LABS:                          11.4   7.80  )-----------( 157      ( 08 Jan 2021 11:32 )             34.8                              01-08    145  |  112<H>  |  41<H>  ----------------------------<  91  3.6   |  22  |  1.42<H>    Ca    8.2<L>      08 Jan 2021 06:28  Mg     2.0     01-08    TPro  5.8<L>  /  Alb  3.2<L>  /  TBili  0.8  /  DBili  <0.2  /  AST  31  /  ALT  12  /  AlkPhos  74  01-08    LIVER FUNCTIONS - ( 08 Jan 2021 06:28 )  Alb: 3.2 g/dL / Pro: 5.8 g/dL / ALK PHOS: 74 U/L / ALT: 12 U/L / AST: 31 U/L / GGT: x                                 PT/INR - ( 07 Jan 2021 17:00 )   PT: 12.8 sec;   INR: 1.09          PTT - ( 08 Jan 2021 11:32 )  PTT:83.3 sec  CAPILLARY BLOOD GLUCOSE      POCT Blood Glucose.: 154 mg/dL (07 Jan 2021 16:03)    CARDIAC MARKERS ( 08 Jan 2021 06:28 )  x     / 0.06 ng/mL / 630 U/L / x     / 8.6 ng/mL  CARDIAC MARKERS ( 08 Jan 2021 01:15 )  x     / 0.07 ng/mL / 657 U/L / x     / 9.7 ng/mL  CARDIAC MARKERS ( 07 Jan 2021 19:30 )  0.395 ng/mL / x     / x     / x     / x      CARDIAC MARKERS ( 07 Jan 2021 17:00 )  0.241 ng/mL / x     / x     / x     / x              Allergies:  No Known Allergies    MEDICATIONS  (STANDING):  aspirin enteric coated 81 milliGRAM(s) Oral daily  atorvastatin 80 milliGRAM(s) Oral at bedtime  folic acid 1 milliGRAM(s) Oral daily  heparin  Infusion.  Unit(s)/Hr (12 mL/Hr) IV Continuous <Continuous>  metoprolol tartrate 25 milliGRAM(s) Oral two times a day  multivitamin 1 Tablet(s) Oral daily  thiamine 100 milliGRAM(s) Oral daily    MEDICATIONS  (PRN):  heparin   Injectable 5500 Unit(s) IV Push every 6 hours PRN For aPTT less than 40  heparin   Injectable 2500 Unit(s) IV Push every 6 hours PRN For aPTT between 40 - 57        DIAGNOSTIC TESTS:        CARDIOLOGY NP PROGRESS NOTE      Icelandic ID #[id number]  Subjective:  Received pt awake in bed in NAD. Icelandic  used for which patient did not respond to any questions from . Bedside RN spoke to patient in Norwegian for which he answered intermittently in English. Patient kept stating name and that he "was old" and "is here". Denies CP/SOB, dizziness/diaphoresis, n/v, palpitations.  Remainder ROS otherwise negative.    Overnight Events:  Straight cath 600cc at 230AM for urinary retention     TELEMETRY: SB- SR (HR 50s-70s)        VITAL SIGNS:  T(C): 36.8 (01-08-21 @ 09:15), Max: 37.3 (01-08-21 @ 00:36)  HR: 85 (01-08-21 @ 12:02) (54 - 97)  BP: 163/67 (01-08-21 @ 12:02) (115/55 - 206/97)  RR: 17 (01-08-21 @ 12:02) (15 - 20)  SpO2: 99% (01-08-21 @ 12:02) (94% - 100%)  Wt(kg): --    I&O's Summary    07 Jan 2021 07:01  -  08 Jan 2021 07:00  --------------------------------------------------------  IN: 48 mL / OUT: 1600 mL / NET: -1552 mL    08 Jan 2021 07:01  -  08 Jan 2021 13:53  --------------------------------------------------------  IN: 84 mL / OUT: 400 mL / NET: -316 mL        Icelandic ID #[id number]  PHYSICAL EXAM:    [facility]  [facility]/English Speaking. WAXES/WANES.  Alert to self and place (hospital). Disoriented to situation and year. No acute distress  Neck: Supple, NO JVD  Cardiac: S1 S2, No M/R/G  Pulmonary: Scattered rhonchi w/non-productive cough. Breathing unlabored on RA  Abdomen: Soft, Non -tender, +BS x 4 quads  Extremities: No Rashes, No edema  Neuro: [place]/Norwegian/English Speaking. WAXES/WANES.  Alert to self and place (hospital). Disoriented to situation and year. No focal deficits          LABS:                          11.4   7.80  )-----------( 157      ( 08 Jan 2021 11:32 )             34.8                              01-08    145  |  112<H>  |  41<H>  ----------------------------<  91  3.6   |  22  |  1.42<H>    Ca    8.2<L>      08 Jan 2021 06:28  Mg     2.0     01-08    TPro  5.8<L>  /  Alb  3.2<L>  /  TBili  0.8  /  DBili  <0.2  /  AST  31  /  ALT  12  /  AlkPhos  74  01-08    LIVER FUNCTIONS - ( 08 Jan 2021 06:28 )  Alb: 3.2 g/dL / Pro: 5.8 g/dL / ALK PHOS: 74 U/L / ALT: 12 U/L / AST: 31 U/L / GGT: x                                 PT/INR - ( 07 Jan 2021 17:00 )   PT: 12.8 sec;   INR: 1.09          PTT - ( 08 Jan 2021 11:32 )  PTT:83.3 sec  CAPILLARY BLOOD GLUCOSE      POCT Blood Glucose.: 154 mg/dL (07 Jan 2021 16:03)    CARDIAC MARKERS ( 08 Jan 2021 06:28 )  x     / 0.06 ng/mL / 630 U/L / x     / 8.6 ng/mL  CARDIAC MARKERS ( 08 Jan 2021 01:15 )  x     / 0.07 ng/mL / 657 U/L / x     / 9.7 ng/mL  CARDIAC MARKERS ( 07 Jan 2021 19:30 )  0.395 ng/mL / x     / x     / x     / x      CARDIAC MARKERS ( 07 Jan 2021 17:00 )  0.241 ng/mL / x     / x     / x     / x              Allergies:  No Known Allergies    MEDICATIONS  (STANDING):  aspirin enteric coated 81 milliGRAM(s) Oral daily  atorvastatin 80 milliGRAM(s) Oral at bedtime  folic acid 1 milliGRAM(s) Oral daily  heparin  Infusion.  Unit(s)/Hr (12 mL/Hr) IV Continuous <Continuous>  metoprolol tartrate 25 milliGRAM(s) Oral two times a day  multivitamin 1 Tablet(s) Oral daily  thiamine 100 milliGRAM(s) Oral daily    MEDICATIONS  (PRN):  heparin   Injectable 5500 Unit(s) IV Push every 6 hours PRN For aPTT less than 40  heparin   Injectable 2500 Unit(s) IV Push every 6 hours PRN For aPTT between 40 - 57        DIAGNOSTIC TESTS:

## 2021-01-08 NOTE — PHYSICAL THERAPY INITIAL EVALUATION ADULT - GAIT DEVIATIONS NOTED, PT EVAL
increased time in double stance/decreased velocity of limb motion/decreased step length/decreased stride length/decreased swing-to-stance ratio

## 2021-01-08 NOTE — PHYSICAL THERAPY INITIAL EVALUATION ADULT - PERTINENT HX OF CURRENT PROBLEM, REHAB EVAL
88 y/o Hungarian/ Solomon Islander/English speaking M, hx of ETOH abuse, POOR HISTORIAN, BIBEMS to [facility] 1/7/20 s/p witnessed fall, found to have Wellen's sign and elevated trop (peaked 0.07) admitted to tele [facility] for medical mgmt NSTEMI (non- consentable for cath)

## 2021-01-08 NOTE — PROGRESS NOTE ADULT - PROBLEM SELECTOR PLAN 4
BUN/Cr 52/1.67, baseline Cr unknown.  --UA large blood, >10 RBC, bacteria present, negative nitrite/LE. Will obtain Urine electrolytes.  --Noted to have urinary retention at [facility] with 1L retrieved during straight cath, will repeat bladder scan. Patient w/urinary retention since admission  - s/p 3 straight caths- most recent bladder scan 1040AM w/400 cc s/p straight cath   - bladder scan 430PM follow up and if  < 250 cc will place Batres Patient w/urinary retention since admission  - s/p 3 straight caths- most recent bladder scan 1040AM w/400 cc s/p straight cath   - bladder scan 430PM follow up and if  < 250 cc will place Batres  - Finasteride 5mg and Flomax 0.4mg added to regimen

## 2021-01-08 NOTE — PROGRESS NOTE ADULT - PROBLEM SELECTOR PLAN 5
afebrile, WBC 13.95 with slight left shift, Lactate 2.2 with improvement 1.2 after IVFs.  --CXR without acute infiltrates, COVID PCR negative, UA: large blood, >10 RBC, bacteria present, negative nitrite/LE and Blood cultures drawn-- results pending.  --received Azithromycin 500mg IV x 1 dose and Ceftriaxone 1g IV x 1 dose at [facility], will continue to monitor for now and discuss need for continuation of antibiotics in AM. On admission, BUN/Creat 52/1.67. Baseline creatinine unknown.   - UA large blood, >10 RBC, bacteria present, negative nitrite/LE. On admission, BUN/Creat 52/1.67. Baseline creatinine unknown.   - UA large blood, >10 RBC, bacteria present, negative nitrite/LE.  - BUN/Creat 41/1.42 today.   - FENA 1.6% c/w intrinsic renal disease  - avoid nephrotoxic agents and renally dose meds  - continue to monitor

## 2021-01-08 NOTE — PROGRESS NOTE ADULT - ASSESSMENT
89 y/o Marshallese/ Jamaican/English speaking M, hx of ETOH abuse, POOR HISTORIAN, BIBEMS to [facility] 1/7/20 s/p witnessed fall, found to have Wellen's sign and elevated trop (peaked 0.07) admitted to tele [facility] for medical mgmt NSTEMI (non- consentable for cath)

## 2021-01-08 NOTE — PROGRESS NOTE ADULT - PROBLEM SELECTOR PLAN 6
Alcohol level <3, will initiate CIWA protocol.  --continue Thiamine 100mg PO daily, MVI and Folate.    VTE PPx: on Heparin gtt  PT consulted: F/U recs afebrile, WBC 13.95 with slight left shift, Lactate 2.2 with improvement 1.2 after IVFs.  --CXR without acute infiltrates, COVID PCR negative, UA: large blood, >10 RBC, bacteria present, negative nitrite/LE and Blood cultures drawn-- results pending.  --received Azithromycin 500mg IV x 1 dose and Ceftriaxone 1g IV x 1 dose at [facility], will continue to monitor for now and discuss need for continuation of antibiotics in AM. WBC 13.95 on admission w/slight left shift. CXR w/out acute infiltrates. COVID negative. UA large  blood, >10 RBC, bacteria present, negative nitrite/LE.   - Lactate 2.2 --> 1.2 s/p IVF   - s/p IV Azithromycin 500mg  x 1  [facility]  - remains afebrile and non toxic appearing. WBC 10.7 today; no need for further abx as d/w Dr. Nagel

## 2021-01-08 NOTE — PROGRESS NOTE ADULT - PROBLEM SELECTOR PLAN 2
Initial EKG@[facility] without acute changes, repeat EKG@[facility] SR@85BPM with PACs and biphasic TW V2-V3 consistent with Wellens sign.   --Troponin I 0.241 and second set Troponin I 0.395.  --Troponin T 0.07, CKMB 9.7, , R/I NSTEMI, started on Heparin gtt per ACS protocol and loaded with Plavix 300mg PO x 1 dose as discussed with CCU fellow Dr. Higgins.  --Continue medical management with ASA/Plavix/Statin.  --Obtain Echocardiogram in AM. S/P Witnessed fall by bystanders on the street; denies prodroma; sx   - CT head - no acute findings. Chronic microvessel ischemic changes.

## 2021-01-08 NOTE — PROGRESS NOTE ADULT - PROBLEM SELECTOR PLAN 3
SBP 190s/100s upon arrival to [facility], improved to 130s/90s after Norvasc 5mg PO x 1 dose and 1 liter urine retrieved from straight cath.  --will start Metoprolol Tartrate 25mg PO BID. No known hx HTN. SBP 190s [facility], improved 130s s/p Norvasc 5mg PO x 1.   - SBP 110s-150s  - c/w metoprolol tartrate 25mg BID

## 2021-01-08 NOTE — PHYSICAL THERAPY INITIAL EVALUATION ADULT - ADDITIONAL COMMENTS
Was not able to get any Social History despite speaking in English, Swiss to a patient or using Bolivian . Patient perseveres telling his birthday and name, country of origin. Has rollator at bedside.

## 2021-01-08 NOTE — PHYSICAL THERAPY INITIAL EVALUATION ADULT - GENERAL OBSERVATIONS, REHAB EVAL
Patient received semi-wilder in bed  in NAD on RA, +Telemetry, +Heprin drip, +Batres. Cleared by NP Bell (concern of elevated troponin, peak has passed as per NP). Agreeable to PT.

## 2021-01-08 NOTE — PHYSICAL THERAPY INITIAL EVALUATION ADULT - CRITERIA FOR SKILLED THERAPEUTIC INTERVENTIONS
impairments found/functional limitations in following categories/risk reduction/prevention/therapy frequency/predicted duration of therapy intervention/anticipated discharge recommendation

## 2021-01-08 NOTE — PROGRESS NOTE ADULT - PROBLEM SELECTOR PLAN 7
Alcohol level <3, will initiate CIWA protocol.  --continue Thiamine 100mg PO daily, MVI and Folate.    VTE PPx: on Heparin gtt  PT consulted: F/U recs HX ETOH. Most likely has ETOH induced Delirium  - c/w CIWA protocol   - Alcohol level <3  - U-tox negative.   - c/w Thiamine 100mg PO daily, MVI and Folate.    F: No IVF  N: DASH/TLC/DM diet  E: Replete lytes PRN K<4, Mg<2  P: DVT PPX: on IV hep gtt   C: FULL CODE  Dispo: Admit to tele [facility]    pending PT eval

## 2021-01-08 NOTE — PROGRESS NOTE ADULT - PROBLEM SELECTOR PLAN 1
witnessed fall by bystanders on street, patient denies prodromal symptoms.   --CT head negative for acute findings. Initial EKG@[facility] w/out acute changes, repeat EKG@[facility] SR@85BPM w/PACs and biphasic TW V2-V3 c/w Wellens sign.   - Trop I 0.241 --> 0.395. Trop T peaked 0.07  - Chol 140 TG 41 HDL 62 LDL 70; hgba1c 5.3  - c/w IV Hep gtt x 48H (end date 1/10/21 @ 3AM)  - s/p Plavix 300mg x 1; c/w Plavix 75mg qd   - c/w ASA 81mg and Atorvastatin 80mg   - c/w metoprolol tartrate 25mg BID   - pending ECHO

## 2021-01-09 DIAGNOSIS — R31.9 HEMATURIA, UNSPECIFIED: ICD-10-CM

## 2021-01-09 LAB
ANION GAP SERPL CALC-SCNC: 13 MMOL/L — SIGNIFICANT CHANGE UP (ref 5–17)
APTT BLD: 58.6 SEC — HIGH (ref 27.5–35.5)
BUN SERPL-MCNC: 43 MG/DL — HIGH (ref 7–23)
CALCIUM SERPL-MCNC: 8.3 MG/DL — LOW (ref 8.4–10.5)
CHLORIDE SERPL-SCNC: 111 MMOL/L — HIGH (ref 96–108)
CO2 SERPL-SCNC: 21 MMOL/L — LOW (ref 22–31)
CREAT SERPL-MCNC: 1.44 MG/DL — HIGH (ref 0.5–1.3)
GLUCOSE SERPL-MCNC: 126 MG/DL — HIGH (ref 70–99)
HCT VFR BLD CALC: 33.3 % — LOW (ref 39–50)
HCT VFR BLD CALC: 35.2 % — LOW (ref 39–50)
HGB BLD-MCNC: 10.8 G/DL — LOW (ref 13–17)
HGB BLD-MCNC: 11.1 G/DL — LOW (ref 13–17)
MAGNESIUM SERPL-MCNC: 1.9 MG/DL — SIGNIFICANT CHANGE UP (ref 1.6–2.6)
MCHC RBC-ENTMCNC: 30.9 PG — SIGNIFICANT CHANGE UP (ref 27–34)
MCHC RBC-ENTMCNC: 31.5 GM/DL — LOW (ref 32–36)
MCHC RBC-ENTMCNC: 31.5 PG — SIGNIFICANT CHANGE UP (ref 27–34)
MCHC RBC-ENTMCNC: 32.4 GM/DL — SIGNIFICANT CHANGE UP (ref 32–36)
MCV RBC AUTO: 97.1 FL — SIGNIFICANT CHANGE UP (ref 80–100)
MCV RBC AUTO: 98.1 FL — SIGNIFICANT CHANGE UP (ref 80–100)
NRBC # BLD: 0 /100 WBCS — SIGNIFICANT CHANGE UP (ref 0–0)
NRBC # BLD: 0 /100 WBCS — SIGNIFICANT CHANGE UP (ref 0–0)
PLATELET # BLD AUTO: 127 K/UL — LOW (ref 150–400)
PLATELET # BLD AUTO: 140 K/UL — LOW (ref 150–400)
POTASSIUM SERPL-MCNC: 3.7 MMOL/L — SIGNIFICANT CHANGE UP (ref 3.5–5.3)
POTASSIUM SERPL-SCNC: 3.7 MMOL/L — SIGNIFICANT CHANGE UP (ref 3.5–5.3)
RBC # BLD: 3.43 M/UL — LOW (ref 4.2–5.8)
RBC # BLD: 3.59 M/UL — LOW (ref 4.2–5.8)
RBC # FLD: 13.7 % — SIGNIFICANT CHANGE UP (ref 10.3–14.5)
RBC # FLD: 13.9 % — SIGNIFICANT CHANGE UP (ref 10.3–14.5)
SODIUM SERPL-SCNC: 145 MMOL/L — SIGNIFICANT CHANGE UP (ref 135–145)
WBC # BLD: 12.04 K/UL — HIGH (ref 3.8–10.5)
WBC # BLD: 8.19 K/UL — SIGNIFICANT CHANGE UP (ref 3.8–10.5)
WBC # FLD AUTO: 12.04 K/UL — HIGH (ref 3.8–10.5)
WBC # FLD AUTO: 8.19 K/UL — SIGNIFICANT CHANGE UP (ref 3.8–10.5)

## 2021-01-09 PROCEDURE — ZZZZZ: CPT

## 2021-01-09 PROCEDURE — 99233 SBSQ HOSP IP/OBS HIGH 50: CPT

## 2021-01-09 RX ORDER — HALOPERIDOL DECANOATE 100 MG/ML
0.5 INJECTION INTRAMUSCULAR ONCE
Refills: 0 | Status: COMPLETED | OUTPATIENT
Start: 2021-01-09 | End: 2021-01-09

## 2021-01-09 RX ORDER — LIDOCAINE HCL 20 MG/ML
5 VIAL (ML) INJECTION ONCE
Refills: 0 | Status: COMPLETED | OUTPATIENT
Start: 2021-01-09 | End: 2021-01-09

## 2021-01-09 RX ORDER — OXYBUTYNIN CHLORIDE 5 MG
5 TABLET ORAL EVERY 8 HOURS
Refills: 0 | Status: DISCONTINUED | OUTPATIENT
Start: 2021-01-09 | End: 2021-01-14

## 2021-01-09 RX ORDER — ATROPA BELLADONNA AND OPIUM 16.2; 6 MG/1; MG/1
1 SUPPOSITORY RECTAL EVERY 8 HOURS
Refills: 0 | Status: DISCONTINUED | OUTPATIENT
Start: 2021-01-09 | End: 2021-01-14

## 2021-01-09 RX ORDER — POTASSIUM CHLORIDE 20 MEQ
40 PACKET (EA) ORAL ONCE
Refills: 0 | Status: COMPLETED | OUTPATIENT
Start: 2021-01-09 | End: 2021-01-09

## 2021-01-09 RX ORDER — SODIUM CHLORIDE 9 MG/ML
1000 INJECTION INTRAMUSCULAR; INTRAVENOUS; SUBCUTANEOUS
Refills: 0 | Status: DISCONTINUED | OUTPATIENT
Start: 2021-01-09 | End: 2021-01-11

## 2021-01-09 RX ORDER — MAGNESIUM SULFATE 500 MG/ML
2 VIAL (ML) INJECTION ONCE
Refills: 0 | Status: COMPLETED | OUTPATIENT
Start: 2021-01-09 | End: 2021-01-09

## 2021-01-09 RX ADMIN — Medication 40 MILLIEQUIVALENT(S): at 09:25

## 2021-01-09 RX ADMIN — HEPARIN SODIUM 1200 UNIT(S)/HR: 5000 INJECTION INTRAVENOUS; SUBCUTANEOUS at 00:44

## 2021-01-09 RX ADMIN — Medication 2 MILLIGRAM(S): at 11:46

## 2021-01-09 RX ADMIN — HALOPERIDOL DECANOATE 0.5 MILLIGRAM(S): 100 INJECTION INTRAMUSCULAR at 08:28

## 2021-01-09 RX ADMIN — Medication 5 MILLILITER(S): at 08:47

## 2021-01-09 RX ADMIN — Medication 25 MILLIGRAM(S): at 09:27

## 2021-01-09 RX ADMIN — HALOPERIDOL DECANOATE 0.5 MILLIGRAM(S): 100 INJECTION INTRAMUSCULAR at 11:05

## 2021-01-09 RX ADMIN — SODIUM CHLORIDE 75 MILLILITER(S): 9 INJECTION INTRAMUSCULAR; INTRAVENOUS; SUBCUTANEOUS at 07:39

## 2021-01-09 RX ADMIN — Medication 50 GRAM(S): at 09:25

## 2021-01-09 NOTE — PROGRESS NOTE ADULT - ASSESSMENT
89 y/o Lithuanian/ Ethiopian/English speaking M, hx of ETOH abuse, POOR HISTORIAN, BIBEMS to [facility] 1/7/20 s/p witnessed fall, found to have Wellen's sign and elevated trop (peaked 0.07) [facility]mt NSTEMI (non- consentable for cath) whose hospital course was c/b by urinary retention, requiring Batres and noted hematuria. Urology following.

## 2021-01-09 NOTE — PROGRESS NOTE ADULT - PROBLEM SELECTOR PLAN 2
S/P Witnessed fall by bystanders on the street; denies prodroma; sx   - CT head - no acute findings. Chronic microvessel ischemic changes. Patient w/urinary retention since admission  - s/p 4 straight caths for which a  whitt was placed on 1/8  - c/w Finasteride 5mg and Flomax 0.4mg added to regimen    #Hematuria  - Patient pulled Whitt out 1/8 PM and significant hematuria noted   - urology following, appreciate recs.  - s/p placement Whitt 14 [address] by urology- patient still had hematuria for which he was placed on CBI w/improvement   - c/w NS @75cc/hr as per urology recs.   - placed on Oxybutynin 5mg q8h and belladona q8h ordered for bladder spasms

## 2021-01-09 NOTE — PROGRESS NOTE ADULT - SUBJECTIVE AND OBJECTIVE BOX
Patient is lethargic following recieving ativan. Patient not able to tolerate PO meds at this time. ASA/Plavix being held today until able to tolerate PO meds. No recent PCI. On IV heparin

## 2021-01-09 NOTE — PROCEDURE NOTE - NSURITECHNIQUE_GU_A_CORE
Proper hand hygiene was performed/Sterile gloves were worn for the duration of the procedure/A sterile drape was used to cover all adjacent areas/The site was cleaned with soap/water and sterile solution (betadine)/The catheter was appropriately lubricated/The catheter was secured with a securement device (e.g. StatLock)/The urinary drainage system is closed at the end of the procedure/The collection bag is below the level of the patient and urinary bladder/All applicable medical record documentation is completed

## 2021-01-09 NOTE — PROGRESS NOTE ADULT - PROBLEM SELECTOR PLAN 3
No known hx HTN. SBP 190s [facility], improved 130s s/p Norvasc 5mg PO x 1.   - SBP 110s-150s  - c/w metoprolol tartrate 25mg BID On admission, BUN/Creat 52/1.67. Baseline creatinine unknown.   - UA large blood, >10 RBC, bacteria present, negative nitrite/LE.  - BUN/Creat 43/1.44 today.   - FENA 1.6% c/w intrinsic renal disease  - consider renal ultrasound if kidney function does not improve   - avoid nephrotoxic agents and renally dose meds  - same plan as above   - continue to monitor

## 2021-01-09 NOTE — PROGRESS NOTE ADULT - PROBLEM SELECTOR PLAN 1
Initial EKG@[facility] w/out acute changes, repeat EKG@[facility] SR@85BPM w/PACs and biphasic TW V2-V3 c/w Wellens sign.   - Trop I 0.241 --> 0.395. Trop T peaked 0.07  - Chol 140 TG 41 HDL 62 LDL 70; hgba1c 5.3  - c/w IV Hep gtt x 48H (end date 1/10/21 @ 3AM)  - s/p Plavix 300mg x 1; c/w Plavix 75mg qd   - c/w ASA 81mg and Atorvastatin 80mg   - c/w metoprolol tartrate 25mg BID   - pending ECHO Initial EKG@[facility] w/out acute changes, repeat EKG@[facility] SR@85BPM w/PACs and biphasic TW V2-V3 c/w Wellens sign.   - Trop I 0.241 --> 0.395. Trop T peaked 0.07  - Chol 140 TG 41 HDL 62 LDL 70; hgba1c 5.3  - c/w IV Hep gtt x 48H (end date 1/10/21 @ 3AM)  - s/p Plavix 300mg x 1; c/w Plavix 75mg qd   - c/w ASA 81mg and Atorvastatin 80mg   - c/w metoprolol tartrate 25mg BID   - ECHO 1/8: Borderline normal LVSF- EF 50-55%, Grade 1LV diastolic dysfunction, mild TR.

## 2021-01-09 NOTE — PROGRESS NOTE ADULT - PROBLEM SELECTOR PLAN 6
WBC 13.95 on admission w/slight left shift. CXR w/out acute infiltrates. COVID negative. UA large  blood, >10 RBC, bacteria present, negative nitrite/LE.   - Lactate 2.2 --> 1.2 s/p IVF   - s/p IV Azithromycin 500mg  x 1  and [facility]  - remains afebrile and non toxic appearing. WBC 10.7 today; no need for further abx as d/w Dr. Nagel WBC 13.95 on admission w/slight left shift. CXR w/out acute infiltrates. COVID negative. UA large  blood, >10 RBC, bacteria present, negative nitrite/LE.   - Lactate 2.2 --> 1.2 s/p IVF   - s/p IV Azithromycin 500mg  x 1  [facility]  - remains afebrile and non toxic appearing. WBC 8.19 today; no need for further abx as d/w Dr. Nagel

## 2021-01-09 NOTE — PROGRESS NOTE ADULT - SUBJECTIVE AND OBJECTIVE BOX
CARDIOLOGY NP PROGRESS NOTE    Chadian  ID #[id number]  Subjective: Received pt awake in bed in NAD, however, confused. Chadian  used, however, patient goes back and forth with speaking Chadian, Slovenian and English. Patient just keeps stating " I am 89 years old I am retired and I do not know what happened". Denies CP/SOB, dizziness/diaphoresis, n/v, palpitations.    Remainder ROS otherwise negative.    Overnight Events:     TELEMETRY:    EKG:      VITAL SIGNS:  T(C): 36.8 (01-09-21 @ 10:28), Max: 37.2 (01-08-21 @ 17:31)  HR: 61 (01-09-21 @ 12:15) (58 - 90)  BP: 155/66 (01-09-21 @ 12:15) (126/65 - 185/77)  RR: 20 (01-09-21 @ 12:15) (18 - 29)  SpO2: 98% (01-09-21 @ 12:15) (95% - 99%)  Wt(kg): --    I&O's Summary    08 Jan 2021 07:01  -  09 Jan 2021 07:00  --------------------------------------------------------  IN: 408 mL / OUT: 1550 mL / NET: -1142 mL    09 Jan 2021 07:01  -  09 Jan 2021 12:36  --------------------------------------------------------  IN: 605 mL / OUT: 6400 mL / NET: -5795 mL          PHYSICAL EXAM:    General: A/ox 3, No acute Distress  Neck: Supple, NO JVD  Cardiac: S1 S2, No M/R/G  Pulmonary: CTAB, Breathing unlabored, No Rhonchi/Rales/Wheezing  Abdomen: Soft, Non -tender, +BS x 4 quads  Extremities: No Rashes, No edema  Neuro: A/o x 3, No focal deficits          LABS:                          10.8   8.19  )-----------( 140      ( 09 Jan 2021 07:52 )             33.3                              01-09    145  |  111<H>  |  43<H>  ----------------------------<  126<H>  3.7   |  21<L>  |  1.44<H>    Ca    8.3<L>      09 Jan 2021 07:52  Mg     1.9     01-09    TPro  5.8<L>  /  Alb  3.2<L>  /  TBili  0.8  /  DBili  <0.2  /  AST  31  /  ALT  12  /  AlkPhos  74  01-08    LIVER FUNCTIONS - ( 08 Jan 2021 06:28 )  Alb: 3.2 g/dL / Pro: 5.8 g/dL / ALK PHOS: 74 U/L / ALT: 12 U/L / AST: 31 U/L / GGT: x                                 PT/INR - ( 07 Jan 2021 17:00 )   PT: 12.8 sec;   INR: 1.09          PTT - ( 08 Jan 2021 23:49 )  PTT:58.6 sec  CAPILLARY BLOOD GLUCOSE        CARDIAC MARKERS ( 08 Jan 2021 06:28 )  x     / 0.06 ng/mL / 630 U/L / x     / 8.6 ng/mL  CARDIAC MARKERS ( 08 Jan 2021 01:15 )  x     / 0.07 ng/mL / 657 U/L / x     / 9.7 ng/mL  CARDIAC MARKERS ( 07 Jan 2021 19:30 )  0.395 ng/mL / x     / x     / x     / x      CARDIAC MARKERS ( 07 Jan 2021 17:00 )  0.241 ng/mL / x     / x     / x     / x              Allergies:  No Known Allergies    MEDICATIONS  (STANDING):  aspirin enteric coated 81 milliGRAM(s) Oral daily  atorvastatin 80 milliGRAM(s) Oral at bedtime  belladonna 16.2 mG/opium 30 mg Suppository 1 Suppository(s) Rectal every 8 hours  clopidogrel Tablet 75 milliGRAM(s) Oral daily  finasteride 5 milliGRAM(s) Oral daily  folic acid 1 milliGRAM(s) Oral daily  heparin  Infusion.  Unit(s)/Hr (12 mL/Hr) IV Continuous <Continuous>  metoprolol tartrate 25 milliGRAM(s) Oral two times a day  multivitamin 1 Tablet(s) Oral daily  oxybutynin 5 milliGRAM(s) Oral every 8 hours  sodium chloride 0.9%. 1000 milliLiter(s) (75 mL/Hr) IV Continuous <Continuous>  tamsulosin 0.4 milliGRAM(s) Oral at bedtime  thiamine 100 milliGRAM(s) Oral daily    MEDICATIONS  (PRN):  heparin   Injectable 5500 Unit(s) IV Push every 6 hours PRN For aPTT less than 40  heparin   Injectable 2500 Unit(s) IV Push every 6 hours PRN For aPTT between 40 - 57  LORazepam   Injectable 2 milliGRAM(s) IV Push every 2 hours PRN Symptom-triggered: 2 point increase in CIWA -Ar score and a total score of 7 or LESS        DIAGNOSTIC TESTS:        CARDIOLOGY NP PROGRESS NOTE    Argentine  ID #[id number]  Subjective: Received pt awake in bed in NAD, however, confused. Argentine  used, however, patient goes back and forth with speaking Argentine, Guatemalan and English. Patient just keeps stating " I am 89 years old I am retired and I do not know what happened". Denies CP/SOB, dizziness/diaphoresis, n/v, palpitations.  Remainder ROS otherwise negative.    Overnight Events:  Refused labs. Short burst of pSVT, asx. BL Mittens ordered as patient removed Batres at 4AM and Urology was consulted for noted hematuria     TELEMETRY: SR (HR 70s-80s)        VITAL SIGNS:  T(C): 36.8 (01-09-21 @ 10:28), Max: 37.2 (01-08-21 @ 17:31)  HR: 61 (01-09-21 @ 12:15) (58 - 90)  BP: 155/66 (01-09-21 @ 12:15) (126/65 - 185/77)  RR: 20 (01-09-21 @ 12:15) (18 - 29)  SpO2: 98% (01-09-21 @ 12:15) (95% - 99%)  Wt(kg): --    I&O's Summary    08 Jan 2021 07:01  -  09 Jan 2021 07:00  --------------------------------------------------------  IN: 408 mL / OUT: 1550 mL / NET: -1142 mL    09 Jan 2021 07:01  -  09 Jan 2021 12:36  --------------------------------------------------------  IN: 605 mL / OUT: 6400 mL / NET: -5795 mL          PHYSICAL EXAM:    General:  [place]gent[place]/English Speaking. WAXES/WANES.  Alert to self and place (hospital). Disoriented to situation and year. No acute distress- patient restless and agitated at times   Neck: Supple, NO JVD  Cardiac: S1 S2, No M/R/G  Pulmonary: Scattered rhonchi w/non-productive cough. Breathing unlabored on RA  Abdomen: Soft, Non -tender, +BS x 4 quads  Extremities: No Rashes, No edema  Neuro: [place]gent[place]/English Speaking. WAXES/WANES.  Alert to self and place (hospital). Disoriented to situation and year. No focal deficits          LABS:                          10.8   8.19  )-----------( 140      ( 09 Jan 2021 07:52 )             33.3                              01-09    145  |  111<H>  |  43<H>  ----------------------------<  126<H>  3.7   |  21<L>  |  1.44<H>    Ca    8.3<L>      09 Jan 2021 07:52  Mg     1.9     01-09    TPro  5.8<L>  /  Alb  3.2<L>  /  TBili  0.8  /  DBili  <0.2  /  AST  31  /  ALT  12  /  AlkPhos  74  01-08    LIVER FUNCTIONS - ( 08 Jan 2021 06:28 )  Alb: 3.2 g/dL / Pro: 5.8 g/dL / ALK PHOS: 74 U/L / ALT: 12 U/L / AST: 31 U/L / GGT: x                                 PT/INR - ( 07 Jan 2021 17:00 )   PT: 12.8 sec;   INR: 1.09          PTT - ( 08 Jan 2021 23:49 )  PTT:58.6 sec  CAPILLARY BLOOD GLUCOSE        CARDIAC MARKERS ( 08 Jan 2021 06:28 )  x     / 0.06 ng/mL / 630 U/L / x     / 8.6 ng/mL  CARDIAC MARKERS ( 08 Jan 2021 01:15 )  x     / 0.07 ng/mL / 657 U/L / x     / 9.7 ng/mL  CARDIAC MARKERS ( 07 Jan 2021 19:30 )  0.395 ng/mL / x     / x     / x     / x      CARDIAC MARKERS ( 07 Jan 2021 17:00 )  0.241 ng/mL / x     / x     / x     / x              Allergies:  No Known Allergies    MEDICATIONS  (STANDING):  aspirin enteric coated 81 milliGRAM(s) Oral daily  atorvastatin 80 milliGRAM(s) Oral at bedtime  belladonna 16.2 mG/opium 30 mg Suppository 1 Suppository(s) Rectal every 8 hours  clopidogrel Tablet 75 milliGRAM(s) Oral daily  finasteride 5 milliGRAM(s) Oral daily  folic acid 1 milliGRAM(s) Oral daily  heparin  Infusion.  Unit(s)/Hr (12 mL/Hr) IV Continuous <Continuous>  metoprolol tartrate 25 milliGRAM(s) Oral two times a day  multivitamin 1 Tablet(s) Oral daily  oxybutynin 5 milliGRAM(s) Oral every 8 hours  sodium chloride 0.9%. 1000 milliLiter(s) (75 mL/Hr) IV Continuous <Continuous>  tamsulosin 0.4 milliGRAM(s) Oral at bedtime  thiamine 100 milliGRAM(s) Oral daily    MEDICATIONS  (PRN):  heparin   Injectable 5500 Unit(s) IV Push every 6 hours PRN For aPTT less than 40  heparin   Injectable 2500 Unit(s) IV Push every 6 hours PRN For aPTT between 40 - 57  LORazepam   Injectable 2 milliGRAM(s) IV Push every 2 hours PRN Symptom-triggered: 2 point increase in CIWA -Ar score and a total score of 7 or LESS        DIAGNOSTIC TESTS:        CARDIOLOGY NP PROGRESS NOTE    Swiss  ID #[id number]  Subjective: Received pt awake in bed in NAD, however, confused. Swiss  used, however, patient goes back and forth with speaking Swiss, Greek and English. Patient just keeps stating " I am 89 years old I am retired and I do not know what happened". Denies CP/SOB, dizziness/diaphoresis, n/v, palpitations.  Remainder ROS otherwise negative.    Overnight Events:  Refused labs. Short burst of pSVT, asx. BL Mittens ordered as patient removed Batres at 4AM and Urology was consulted for noted hematuria     TELEMETRY: SR (HR 70s-80s)        VITAL SIGNS:  T(C): 36.8 (01-09-21 @ 10:28), Max: 37.2 (01-08-21 @ 17:31)  HR: 61 (01-09-21 @ 12:15) (58 - 90)  BP: 155/66 (01-09-21 @ 12:15) (126/65 - 185/77)  RR: 20 (01-09-21 @ 12:15) (18 - 29)  SpO2: 98% (01-09-21 @ 12:15) (95% - 99%)  Wt(kg): --    I&O's Summary    08 Jan 2021 07:01  -  09 Jan 2021 07:00  --------------------------------------------------------  IN: 408 mL / OUT: 1550 mL / NET: -1142 mL    09 Jan 2021 07:01  -  09 Jan 2021 12:36  --------------------------------------------------------  IN: 605 mL / OUT: 6400 mL / NET: -5795 mL          PHYSICAL EXAM:    General:  [place]/Swiss/English Speaking. WAXES/WANES.  Alert to self and place (hospital). Disoriented to situation and year. No acute distress- patient restless and agitated at times. Most likely has ETOH induced Delirium  Neck: Supple, NO JVD  Cardiac: S1 S2, No M/R/G  Pulmonary: Scattered rhonchi w/non-productive cough. Breathing unlabored on RA  Abdomen: Soft, Non -tender, +BS x 4 quads  Extremities: No Rashes, No edema  Neuro: [place]/Swiss/English Speaking. WAXES/WANES.  Alert to self and place (hospital). Disoriented to situation and year. No focal deficits          LABS:                          10.8   8.19  )-----------( 140      ( 09 Jan 2021 07:52 )             33.3                              01-09    145  |  111<H>  |  43<H>  ----------------------------<  126<H>  3.7   |  21<L>  |  1.44<H>    Ca    8.3<L>      09 Jan 2021 07:52  Mg     1.9     01-09    TPro  5.8<L>  /  Alb  3.2<L>  /  TBili  0.8  /  DBili  <0.2  /  AST  31  /  ALT  12  /  AlkPhos  74  01-08    LIVER FUNCTIONS - ( 08 Jan 2021 06:28 )  Alb: 3.2 g/dL / Pro: 5.8 g/dL / ALK PHOS: 74 U/L / ALT: 12 U/L / AST: 31 U/L / GGT: x                                 PT/INR - ( 07 Jan 2021 17:00 )   PT: 12.8 sec;   INR: 1.09          PTT - ( 08 Jan 2021 23:49 )  PTT:58.6 sec  CAPILLARY BLOOD GLUCOSE        CARDIAC MARKERS ( 08 Jan 2021 06:28 )  x     / 0.06 ng/mL / 630 U/L / x     / 8.6 ng/mL  CARDIAC MARKERS ( 08 Jan 2021 01:15 )  x     / 0.07 ng/mL / 657 U/L / x     / 9.7 ng/mL  CARDIAC MARKERS ( 07 Jan 2021 19:30 )  0.395 ng/mL / x     / x     / x     / x      CARDIAC MARKERS ( 07 Jan 2021 17:00 )  0.241 ng/mL / x     / x     / x     / x              Allergies:  No Known Allergies    MEDICATIONS  (STANDING):  aspirin enteric coated 81 milliGRAM(s) Oral daily  atorvastatin 80 milliGRAM(s) Oral at bedtime  belladonna 16.2 mG/opium 30 mg Suppository 1 Suppository(s) Rectal every 8 hours  clopidogrel Tablet 75 milliGRAM(s) Oral daily  finasteride 5 milliGRAM(s) Oral daily  folic acid 1 milliGRAM(s) Oral daily  heparin  Infusion.  Unit(s)/Hr (12 mL/Hr) IV Continuous <Continuous>  metoprolol tartrate 25 milliGRAM(s) Oral two times a day  multivitamin 1 Tablet(s) Oral daily  oxybutynin 5 milliGRAM(s) Oral every 8 hours  sodium chloride 0.9%. 1000 milliLiter(s) (75 mL/Hr) IV Continuous <Continuous>  tamsulosin 0.4 milliGRAM(s) Oral at bedtime  thiamine 100 milliGRAM(s) Oral daily    MEDICATIONS  (PRN):  heparin   Injectable 5500 Unit(s) IV Push every 6 hours PRN For aPTT less than 40  heparin   Injectable 2500 Unit(s) IV Push every 6 hours PRN For aPTT between 40 - 57  LORazepam   Injectable 2 milliGRAM(s) IV Push every 2 hours PRN Symptom-triggered: 2 point increase in CIWA -Ar score and a total score of 7 or LESS        DIAGNOSTIC TESTS:

## 2021-01-09 NOTE — PROGRESS NOTE ADULT - PROBLEM SELECTOR PLAN 5
On admission, BUN/Creat 52/1.67. Baseline creatinine unknown.   - UA large blood, >10 RBC, bacteria present, negative nitrite/LE.  - BUN/Creat 41/1.42 today.   - FENA 1.6% c/w intrinsic renal disease  - avoid nephrotoxic agents and renally dose meds  - continue to monitor No known hx HTN. SBP 190s LHGV, improved 130s s/p Norvasc 5mg PO x 1.   - SBP 130s-140s  - c/w metoprolol tartrate 25mg BID

## 2021-01-09 NOTE — CONSULT NOTE ADULT - ASSESSMENT
89 y/o [place]/ Hungarian/English speaking M, hx of ETOH abuse, POOR HISTORIAN, BIBEMS to [facility] 1/7/20 s/p witnessed fall, found to have Wellen's sign and elevated trop (peaked 0.07) admitted to [facility] for medical mgmt NSTEMI (non- consentable for cath) whose hospital course was c/b urinary retention and hematuria 2/2 pt pulling out whitt catheter.

## 2021-01-09 NOTE — CONSULT NOTE ADULT - SUBJECTIVE AND OBJECTIVE BOX
89 y/o Canadian/ Greenlandic/English speaking M, hx of ETOH abuse, POOR HISTORIAN, BIBEMS to [facility] 1/7/20 s/p witnessed fall, found to have Wellen's sign and elevated trop (peaked 0.07) admitted to [address] for medical mgmt NSTEMI (non- consentable for cath) whose hospital course was c/b by urinary retention, requiring Whitt. As per primary team, pt pulled whitt which caused carlos enrique hematuria. Pt is on plavix and heparin. No known hx BPH, urologic hx.    Assessed at bedside. 22 3-way hematuria caude placed, manual irrigation performed. No clots.  Placed on traction x 2 hours and CBI.      Vital Signs Last 24 Hrs  T(C): 36.9 (09 Jan 2021 14:12), Max: 36.9 (09 Jan 2021 14:12)  T(F): 98.4 (09 Jan 2021 14:12), Max: 98.4 (09 Jan 2021 14:12)  HR: 65 (09 Jan 2021 17:00) (61 - 90)  BP: 174/73 (09 Jan 2021 17:00) (116/78 - 185/77)  BP(mean): 105 (09 Jan 2021 17:00) (82 - 111)  RR: 17 (09 Jan 2021 17:00) (17 - 29)  SpO2: 98% (09 Jan 2021 17:00) (95% - 99%)  I&O's Summary    08 Jan 2021 07:01  -  09 Jan 2021 07:00  --------------------------------------------------------  IN: 408 mL / OUT: 1550 mL / NET: -1142 mL    09 Jan 2021 07:01  -  09 Jan 2021 17:39  --------------------------------------------------------  IN: 1127 mL / OUT: 07418 mL / NET: -20073 mL        PE:  Gen: awake and alert. combative.  Abd: nontender, nondistended  : no suprapubic/CVAT. FC intact CBI ON      LABS:                        11.1   12.04 )-----------( 127      ( 09 Jan 2021 16:32 )             35.2     01-09    145  |  111<H>  |  43<H>  ----------------------------<  126<H>  3.7   |  21<L>  |  1.44<H>    Ca    8.3<L>      09 Jan 2021 07:52  Mg     1.9     01-09    TPro  5.8<L>  /  Alb  3.2<L>  /  TBili  0.8  /  DBili  <0.2  /  AST  31  /  ALT  12  /  AlkPhos  74  01-08    PTT - ( 08 Jan 2021 23:49 )  PTT:58.6 sec  Cultures  Culture Results:   No growth (01-08 @ 01:31)  Culture Results:   No growth to date. (01-08 @ 00:18)  Culture Results:   No growth to date. (01-08 @ 00:18)      A/P

## 2021-01-09 NOTE — PROGRESS NOTE ADULT - PROBLEM SELECTOR PLAN 4
Patient w/urinary retention since admission  - s/p 3 straight caths- most recent bladder scan 1040AM w/400 cc s/p straight cath   - bladder scan 430PM follow up and if  < 250 cc will place Batres  - Finasteride 5mg and Flomax 0.4mg added to regimen S/P Witnessed fall by bystanders on the street; denies prodroma; sx   - CT head - no acute findings. Chronic microvessel ischemic changes.

## 2021-01-09 NOTE — PROGRESS NOTE ADULT - PROBLEM SELECTOR PLAN 7
HX ETOH. Most likely has ETOH induced Delirium  - c/w CIWA protocol   - Alcohol level <3  - U-tox negative.   - c/w Thiamine 100mg PO daily, MVI and Folate.    F: No IVF  N: DASH/TLC/DM diet  E: Replete lytes PRN K<4, Mg<2  P: DVT PPX: on IV hep gtt   C: FULL CODE  Dispo: Admit to tele [facility]    pending PT eval HX ETOH. Most likely has ETOH induced Delirium  - c/w CIWA protocol   - Alcohol level <3  - U-tox negative.   - c/w Thiamine 100mg PO daily, MVI and Folate.    F: IV NaCl 0.9% @ 75cc/hr   N: DASH/TLC/DM diet  E: Replete lytes PRN K<4, Mg<2  P: DVT PPX: on IV hep gtt   C: FULL CODE  Dispo: Admit to tele [facility]; **Social work aware patient does not know where he lives and emergency contact that was listed did not recognize patient name and stated does not know him**    pending PT eval

## 2021-01-10 LAB
ANION GAP SERPL CALC-SCNC: 15 MMOL/L — SIGNIFICANT CHANGE UP (ref 5–17)
APTT BLD: 27.1 SEC — LOW (ref 27.5–35.5)
BUN SERPL-MCNC: 31 MG/DL — HIGH (ref 7–23)
CALCIUM SERPL-MCNC: 8.1 MG/DL — LOW (ref 8.4–10.5)
CHLORIDE SERPL-SCNC: 112 MMOL/L — HIGH (ref 96–108)
CO2 SERPL-SCNC: 18 MMOL/L — LOW (ref 22–31)
CREAT SERPL-MCNC: 1.22 MG/DL — SIGNIFICANT CHANGE UP (ref 0.5–1.3)
GLUCOSE SERPL-MCNC: 95 MG/DL — SIGNIFICANT CHANGE UP (ref 70–99)
HCT VFR BLD CALC: 32 % — LOW (ref 39–50)
HGB BLD-MCNC: 9.8 G/DL — LOW (ref 13–17)
MAGNESIUM SERPL-MCNC: 2.3 MG/DL — SIGNIFICANT CHANGE UP (ref 1.6–2.6)
MCHC RBC-ENTMCNC: 30.6 GM/DL — LOW (ref 32–36)
MCHC RBC-ENTMCNC: 31.6 PG — SIGNIFICANT CHANGE UP (ref 27–34)
MCV RBC AUTO: 103.2 FL — HIGH (ref 80–100)
NRBC # BLD: 0 /100 WBCS — SIGNIFICANT CHANGE UP (ref 0–0)
PLATELET # BLD AUTO: 124 K/UL — LOW (ref 150–400)
POTASSIUM SERPL-MCNC: 4.2 MMOL/L — SIGNIFICANT CHANGE UP (ref 3.5–5.3)
POTASSIUM SERPL-SCNC: 4.2 MMOL/L — SIGNIFICANT CHANGE UP (ref 3.5–5.3)
RBC # BLD: 3.1 M/UL — LOW (ref 4.2–5.8)
RBC # FLD: 13.9 % — SIGNIFICANT CHANGE UP (ref 10.3–14.5)
SODIUM SERPL-SCNC: 145 MMOL/L — SIGNIFICANT CHANGE UP (ref 135–145)
WBC # BLD: 7.17 K/UL — SIGNIFICANT CHANGE UP (ref 3.8–10.5)
WBC # FLD AUTO: 7.17 K/UL — SIGNIFICANT CHANGE UP (ref 3.8–10.5)

## 2021-01-10 PROCEDURE — 99232 SBSQ HOSP IP/OBS MODERATE 35: CPT

## 2021-01-10 PROCEDURE — 99239 HOSP IP/OBS DSCHRG MGMT >30: CPT

## 2021-01-10 RX ADMIN — Medication 1 TABLET(S): at 11:36

## 2021-01-10 RX ADMIN — ATORVASTATIN CALCIUM 80 MILLIGRAM(S): 80 TABLET, FILM COATED ORAL at 22:55

## 2021-01-10 RX ADMIN — ATROPA BELLADONNA AND OPIUM 1 SUPPOSITORY(S): 16.2; 6 SUPPOSITORY RECTAL at 22:54

## 2021-01-10 RX ADMIN — Medication 25 MILLIGRAM(S): at 22:55

## 2021-01-10 RX ADMIN — Medication 100 MILLIGRAM(S): at 11:36

## 2021-01-10 RX ADMIN — Medication 81 MILLIGRAM(S): at 11:36

## 2021-01-10 RX ADMIN — FINASTERIDE 5 MILLIGRAM(S): 5 TABLET, FILM COATED ORAL at 11:36

## 2021-01-10 RX ADMIN — Medication 25 MILLIGRAM(S): at 00:22

## 2021-01-10 RX ADMIN — Medication 1 MILLIGRAM(S): at 11:36

## 2021-01-10 RX ADMIN — SODIUM CHLORIDE 75 MILLILITER(S): 9 INJECTION INTRAMUSCULAR; INTRAVENOUS; SUBCUTANEOUS at 22:34

## 2021-01-10 RX ADMIN — TAMSULOSIN HYDROCHLORIDE 0.4 MILLIGRAM(S): 0.4 CAPSULE ORAL at 22:55

## 2021-01-10 RX ADMIN — ATROPA BELLADONNA AND OPIUM 1 SUPPOSITORY(S): 16.2; 6 SUPPOSITORY RECTAL at 14:40

## 2021-01-10 RX ADMIN — Medication 5 MILLIGRAM(S): at 22:55

## 2021-01-10 RX ADMIN — Medication 5 MILLIGRAM(S): at 14:40

## 2021-01-10 RX ADMIN — CLOPIDOGREL BISULFATE 75 MILLIGRAM(S): 75 TABLET, FILM COATED ORAL at 11:36

## 2021-01-10 RX ADMIN — CLOPIDOGREL BISULFATE 75 MILLIGRAM(S): 75 TABLET, FILM COATED ORAL at 00:22

## 2021-01-10 RX ADMIN — SODIUM CHLORIDE 75 MILLILITER(S): 9 INJECTION INTRAMUSCULAR; INTRAVENOUS; SUBCUTANEOUS at 08:58

## 2021-01-10 RX ADMIN — Medication 25 MILLIGRAM(S): at 11:21

## 2021-01-10 NOTE — PROGRESS NOTE ADULT - SUBJECTIVE AND OBJECTIVE BOX
CARDIOLOGY NP PROGRESS NOTE    Subjective: Patient seen and examined by me. Intermittent confusion but baseline per notes. Denies complaints, some discomfort around whitt. Remainder ROS otherwise negative.    Overnight Events: No acute events overnight. Got IV Ativan which made him very groggy. CBI continued with amelioration of hematuria. Urology saw this AM - recs to keep whitt off CBI today with no intervention at this time.     TELEMETRY: SR 50's-70's, occ PACs    VITAL SIGNS:  T(C): 36.4 (01-10-21 @ 10:15), Max: 36.9 (01-09-21 @ 14:12)  HR: 59 (01-10-21 @ 08:20) (59 - 78)  BP: 154/67 (01-10-21 @ 08:20) (107/88 - 185/77)  RR: 18 (01-10-21 @ 08:20) (17 - 29)  SpO2: 96% (01-10-21 @ 08:20) (96% - 100%)      I&O's Summary    09 Jan 2021 07:01  -  10 Bobby 2021 07:00  --------------------------------------------------------  IN: 2384 mL / OUT: 10641 mL / NET: -95067 mL    10 Bobby 2021 07:01  -  10 Bobby 2021 10:27  --------------------------------------------------------  IN: 3075 mL / OUT: 3250 mL / NET: -175 mL    PHYSICAL EXAM:  Awake, intermittent confusion, AxOx2, in NAD  follows simple commands, appropriate  Neck supple, no JVD noted  PEERL, nasal/buccal mucosa moist and well perfused  BS clear bilaterally, unlabored, symmetrical  S1/S2, no S3, RRR, no M/G/R noted  Abdomen soft, non tender, non distended  No edema noted, perfusion brisk  Pulses palpable throughout  Skin warm and dry, no rashes/lesions noted      LABS:                          9.8    7.17  )-----------( 124      ( 10 Bobby 2021 06:54 )             32.0                              01-10    145  |  112<H>  |  31<H>  ----------------------------<  95  4.2   |  18<L>  |  1.22    Ca    8.1<L>      10 Bobby 2021 06:54  Mg     2.3     01-10    PTT - ( 10 Bobby 2021 06:54 )  PTT:27.1 sec  CAPILLARY BLOOD GLUCOSE    Allergies:  No Known Allergies    MEDICATIONS  (STANDING):  aspirin enteric coated 81 milliGRAM(s) Oral daily  atorvastatin 80 milliGRAM(s) Oral at bedtime  belladonna 16.2 mG/opium 30 mg Suppository 1 Suppository(s) Rectal every 8 hours  clopidogrel Tablet 75 milliGRAM(s) Oral daily  finasteride 5 milliGRAM(s) Oral daily  folic acid 1 milliGRAM(s) Oral daily  metoprolol tartrate 25 milliGRAM(s) Oral two times a day  multivitamin 1 Tablet(s) Oral daily  oxybutynin 5 milliGRAM(s) Oral every 8 hours  sodium chloride 0.9%. 1000 milliLiter(s) (75 mL/Hr) IV Continuous <Continuous>  tamsulosin 0.4 milliGRAM(s) Oral at bedtime  thiamine 100 milliGRAM(s) Oral daily    MEDICATIONS  (PRN):  LORazepam   Injectable 2 milliGRAM(s) IV Push every 2 hours PRN Symptom-triggered: 2 point increase in CIWA -Ar score and a total score of 7 or LESS             CARDIOLOGY NP PROGRESS NOTE    Subjective: Patient seen and examined by me. Intermittent confusion but baseline per notes. Denies complaints, some discomfort around whitt. Remainder ROS otherwise negative.    Overnight Events: No acute events overnight. Got IV Ativan which made him very groggy. Cr downtrending - 1.22 today.  CBI continued with amelioration of hematuria. Urology saw this AM - recs to keep whitt off CBI today with no intervention at this time.     TELEMETRY: SR 50's-70's, occ PACs    VITAL SIGNS:  T(C): 36.4 (01-10-21 @ 10:15), Max: 36.9 (01-09-21 @ 14:12)  HR: 59 (01-10-21 @ 08:20) (59 - 78)  BP: 154/67 (01-10-21 @ 08:20) (107/88 - 185/77)  RR: 18 (01-10-21 @ 08:20) (17 - 29)  SpO2: 96% (01-10-21 @ 08:20) (96% - 100%)      I&O's Summary    09 Jan 2021 07:01  -  10 Bobby 2021 07:00  --------------------------------------------------------  IN: 2384 mL / OUT: 42422 mL / NET: -14159 mL    10 Bobby 2021 07:01  -  10 Bobby 2021 10:27  --------------------------------------------------------  IN: 3075 mL / OUT: 3250 mL / NET: -175 mL    PHYSICAL EXAM:  Awake, intermittent confusion, AxOx2, in NAD  follows simple commands, appropriate  Neck supple, no JVD noted  PEERL, nasal/buccal mucosa moist and well perfused  BS clear bilaterally, unlabored, symmetrical  S1/S2, no S3, RRR, no M/G/R noted  Abdomen soft, non tender, non distended  No edema noted, perfusion brisk  Pulses palpable throughout  Skin warm and dry, no rashes/lesions noted      LABS:                          9.8    7.17  )-----------( 124      ( 10 Bobby 2021 06:54 )             32.0                              01-10    145  |  112<H>  |  31<H>  ----------------------------<  95  4.2   |  18<L>  |  1.22    Ca    8.1<L>      10 Bobby 2021 06:54  Mg     2.3     01-10    PTT - ( 10 Bobby 2021 06:54 )  PTT:27.1 sec  CAPILLARY BLOOD GLUCOSE    Allergies:  No Known Allergies    MEDICATIONS  (STANDING):  aspirin enteric coated 81 milliGRAM(s) Oral daily  atorvastatin 80 milliGRAM(s) Oral at bedtime  belladonna 16.2 mG/opium 30 mg Suppository 1 Suppository(s) Rectal every 8 hours  clopidogrel Tablet 75 milliGRAM(s) Oral daily  finasteride 5 milliGRAM(s) Oral daily  folic acid 1 milliGRAM(s) Oral daily  metoprolol tartrate 25 milliGRAM(s) Oral two times a day  multivitamin 1 Tablet(s) Oral daily  oxybutynin 5 milliGRAM(s) Oral every 8 hours  sodium chloride 0.9%. 1000 milliLiter(s) (75 mL/Hr) IV Continuous <Continuous>  tamsulosin 0.4 milliGRAM(s) Oral at bedtime  thiamine 100 milliGRAM(s) Oral daily    MEDICATIONS  (PRN):  LORazepam   Injectable 2 milliGRAM(s) IV Push every 2 hours PRN Symptom-triggered: 2 point increase in CIWA -Ar score and a total score of 7 or LESS             CARDIOLOGY NP PROGRESS NOTE    Subjective: Patient seen and examined by me. Intermittent confusion but baseline per notes. Denies complaints, some discomfort around whitt. Remainder ROS otherwise negative.    Overnight Events: No acute events overnight. Got IV Ativan which made him very groggy. Cr downtrending - 1.22 today.  CBI continued with amelioration of hematuria. Urology saw this AM - recs to keep whitt off CBI today with no intervention at this time.     TELEMETRY: SR 50's-70's, occ PACs    VITAL SIGNS:   T(C): 36.4 (01-10-21 @ 10:15), Max: 36.9 (01-09-21 @ 14:12)  HR: 59 (01-10-21 @ 08:20) (59 - 78)  BP: 154/67 (01-10-21 @ 08:20) (107/88 - 185/77)  RR: 18 (01-10-21 @ 08:20) (17 - 29)  SpO2: 96% (01-10-21 @ 08:20) (96% - 100%)      I&O's Summary    09 Jan 2021 07:01  -  10 Bobby 2021 07:00  --------------------------------------------------------  IN: 2384 mL / OUT: 82180 mL / NET: -18306 mL    10 Bobby 2021 07:01  -  10 Bobby 2021 10:27  --------------------------------------------------------  IN: 3075 mL / OUT: 3250 mL / NET: -175 mL    PHYSICAL EXAM:  Awake, intermittent confusion, AxOx2, in NAD  follows simple commands, appropriate  Neck supple, no JVD noted  PEERL, nasal/buccal mucosa moist and well perfused  BS clear bilaterally, unlabored, symmetrical  S1/S2, no S3, RRR, no M/G/R noted  Abdomen soft, non tender, non distended  No edema noted, perfusion brisk  Pulses palpable throughout  Skin warm and dry, no rashes/lesions noted      LABS:                          9.8    7.17  )-----------( 124      ( 10 Bobby 2021 06:54 )             32.0                              01-10    145  |  112<H>  |  31<H>  ----------------------------<  95  4.2   |  18<L>  |  1.22    Ca    8.1<L>      10 Bobby 2021 06:54  Mg     2.3     01-10    PTT - ( 10 Bobby 2021 06:54 )  PTT:27.1 sec  CAPILLARY BLOOD GLUCOSE    Allergies:  No Known Allergies    MEDICATIONS  (STANDING):  aspirin enteric coated 81 milliGRAM(s) Oral daily  atorvastatin 80 milliGRAM(s) Oral at bedtime  belladonna 16.2 mG/opium 30 mg Suppository 1 Suppository(s) Rectal every 8 hours  clopidogrel Tablet 75 milliGRAM(s) Oral daily  finasteride 5 milliGRAM(s) Oral daily  folic acid 1 milliGRAM(s) Oral daily  metoprolol tartrate 25 milliGRAM(s) Oral two times a day  multivitamin 1 Tablet(s) Oral daily  oxybutynin 5 milliGRAM(s) Oral every 8 hours  sodium chloride 0.9%. 1000 milliLiter(s) (75 mL/Hr) IV Continuous <Continuous>  tamsulosin 0.4 milliGRAM(s) Oral at bedtime  thiamine 100 milliGRAM(s) Oral daily    MEDICATIONS  (PRN):  LORazepam   Injectable 2 milliGRAM(s) IV Push every 2 hours PRN Symptom-triggered: 2 point increase in CIWA -Ar score and a total score of 7 or LESS

## 2021-01-10 NOTE — PROGRESS NOTE ADULT - PROBLEM SELECTOR PLAN 2
Patient w/urinary retention since admission  - s/p 4 straight caths for which a  whitt was placed on 1/8  - c/w Finasteride 5mg and Flomax 0.4mg added to regimen    #Hematuria  - Patient pulled Whitt out 1/8 PM and significant hematuria noted   - urology following, appreciate recs.  - continue #22 3 way cath per urology with no CBI - hematuria cleared.   - placed on Oxybutynin 5mg q8h and belladona q8h ordered for bladder spasms

## 2021-01-10 NOTE — PROGRESS NOTE ADULT - ASSESSMENT
88 y/o Montenegrin/ South Korean/English speaking M, hx of ETOH abuse, POOR HISTORIAN, BIBEMS to [facility] 1/7/20 s/p witnessed fall, found to have Wellen's sign and elevated trop (peaked 0.07) admitted to tele [facility] for medical mgmt NSTEMI (non-consentable for cath); hospital course c/b urinary retention, requiring Batres and noted hematuria which has cleared with CBI. Urology following. Stable; pending discharge impediments.

## 2021-01-10 NOTE — PROGRESS NOTE ADULT - PROBLEM SELECTOR PLAN 6
IMPROVED. WBC 7.17 1/10. WBC 13.95 on admission w/slight left shift. CXR w/out acute infiltrates. COVID negative. UA large  blood, >10 RBC, bacteria present, negative nitrite/LE.   - Lactate 2.2 --> 1.2 s/p IVF   - s/p IV Azithromycin 500mg  x 1  and [facility]g [facility]  - remains afebrile and non toxic appearing. No need for further abx as d/w Dr. Nagel

## 2021-01-10 NOTE — PROGRESS NOTE ADULT - ASSESSMENT
Assessment: 89M PMH ETOH abuse, s/p witnessed fall, admitted for medical management of NSTEMI (non- consentable for cath), hospital course c/b urinary retention and hematuria 2/2 patient pulling out whitt catheter, requiring CBI. Patient's urine is clear on CBI overnight, with minimal clot this morning on manual irrigation. Patient's hemoglobin slightly decreased, but remains hemodynamically stable.    Recommendations:  - Continue 22F 3-way catheter with 60cc in balloon off CBI  - Continue care per primary team  - No urologic surgery intervention at this time

## 2021-01-10 NOTE — PROGRESS NOTE ADULT - SUBJECTIVE AND OBJECTIVE BOX
SUBJECTIVE: Denies suprapubic/flank pain or fevers/chills      OBJECTIVE:    Vital Signs:  Vital Signs Last 24 Hrs  T(C): 36.8 (10 Bobby 2021 06:36), Max: 36.9 (09 Jan 2021 14:12)  T(F): 98.3 (10 Bobby 2021 06:36), Max: 98.5 (09 Jan 2021 22:22)  HR: 59 (10 Bobby 2021 08:20) (59 - 78)  BP: 154/67 (10 Bobby 2021 08:20) (107/88 - 185/77)  BP(mean): 96 (10 Bobby 2021 08:20) (82 - 111)  RR: 18 (10 Bobby 2021 08:20) (17 - 29)  SpO2: 96% (10 Bobby 2021 08:20) (96% - 100%)    Physical Exam:  General: NAD  Pulmonary: Nonlabored breathing, no respiratory distress  Abdominal: No suprapubic tenderness  : 22F 3-way catheter with 60cc in balloon with clear urine on CBI    Ins and Outs:  I&O's Summary    09 Jan 2021 07:01  -  10 Bobby 2021 07:00  --------------------------------------------------------  IN: 2384 mL / OUT: 84062 mL / NET: -95396 mL    10 Bobby 2021 07:01  -  10 Bobby 2021 10:02  --------------------------------------------------------  IN: 75 mL / OUT: 2000 mL / NET: -1925 mL        Labs:                        9.8    7.17  )-----------( 124      ( 10 Bobby 2021 06:54 )             32.0     01-10    145  |  112<H>  |  31<H>  ----------------------------<  95  4.2   |  18<L>  |  1.22    Ca    8.1<L>      10 Bobby 2021 06:54  Mg     2.3     01-10      PTT - ( 10 Bobby 2021 06:54 )  PTT:27.1 sec    CAPILLARY BLOOD GLUCOSE

## 2021-01-10 NOTE — PROGRESS NOTE ADULT - PROBLEM SELECTOR PLAN 1
Initial EKG@[facility] w/out acute changes, repeat EKG@[facility] SR@85BPM w/PACs and biphasic TW V2-V3 c/w Wellens sign.   - Trop I 0.241 --> 0.395. Trop T peaked 0.07  - Chol 140 TG 41 HDL 62 LDL 70; hgba1c 5.3  -c/w Plavix 75mg qd   -c/w ASA 81mg and Atorvastatin 80mg   - c/w metoprolol tartrate 25mg BID   - ECHO 1/8: Borderline normal LVSF- EF 50-55%, Grade 1LV diastolic dysfunction, mild TR.

## 2021-01-10 NOTE — PROGRESS NOTE ADULT - PROBLEM SELECTOR PLAN 7
HX ETOH. Most likely has ETOH induced Delirium  - c/w CIWA protocol   - Alcohol level <3  - U-tox negative.   - c/w Thiamine 100mg PO daily, MVI and Folate.    F: IV NaCl 0.9% @ 75cc/hr   N: DASH/TLC/DM diet  E: Replete lytes PRN K<4, Mg<2  P: DVT PPX: on IV hep gtt   C: FULL CODE  Dispo: Admit to tele [facility]; **Social work aware patient does not know where he lives and emergency contact that was listed did not recognize patient name and stated does not know him**    pending PT eval

## 2021-01-10 NOTE — PROGRESS NOTE ADULT - PROBLEM SELECTOR PLAN 4
S/P Witnessed fall by bystanders on the street; denies prodroma; sx   - CT head - no acute findings. Chronic microvessel ischemic changes.

## 2021-01-10 NOTE — PROGRESS NOTE ADULT - PROBLEM SELECTOR PLAN 5
No known hx HTN. SBP 190s [facility], improved 130s s/p Norvasc 5mg PO x 1.   - SBP 130s-140s  - c/w metoprolol tartrate 25mg BID

## 2021-01-10 NOTE — PROGRESS NOTE ADULT - PROBLEM SELECTOR PLAN 3
On admission, BUN/Creat 52/1.67. Baseline creatinine unknown.   - UA large blood, >10 RBC, bacteria present, negative nitrite/LE.  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 1.22 1/10/21. On admission, BUN/Creat 52/1.67. Baseline creatinine unknown.   - UA large blood, >10 RBC, bacteria present, negative nitrite/LE.  - FENA 1.6% c/w intrinsic renal disease  - consider renal ultrasound if kidney function does not improve   - avoid nephrotoxic agents and renally dose meds

## 2021-01-11 DIAGNOSIS — G93.41 METABOLIC ENCEPHALOPATHY: ICD-10-CM

## 2021-01-11 DIAGNOSIS — D64.9 ANEMIA, UNSPECIFIED: ICD-10-CM

## 2021-01-11 DIAGNOSIS — R63.8 OTHER SYMPTOMS AND SIGNS CONCERNING FOOD AND FLUID INTAKE: ICD-10-CM

## 2021-01-11 LAB
ALBUMIN SERPL ELPH-MCNC: 2.7 G/DL — LOW (ref 3.3–5)
ALP SERPL-CCNC: 56 U/L — SIGNIFICANT CHANGE UP (ref 40–120)
ALT FLD-CCNC: 11 U/L — SIGNIFICANT CHANGE UP (ref 10–45)
AMMONIA BLD-MCNC: <10 UMOL/L — LOW (ref 11–55)
ANION GAP SERPL CALC-SCNC: 8 MMOL/L — SIGNIFICANT CHANGE UP (ref 5–17)
AST SERPL-CCNC: 21 U/L — SIGNIFICANT CHANGE UP (ref 10–40)
BASOPHILS # BLD AUTO: 0.06 K/UL — SIGNIFICANT CHANGE UP (ref 0–0.2)
BASOPHILS NFR BLD AUTO: 0.9 % — SIGNIFICANT CHANGE UP (ref 0–2)
BILIRUB SERPL-MCNC: 0.9 MG/DL — SIGNIFICANT CHANGE UP (ref 0.2–1.2)
BLD GP AB SCN SERPL QL: NEGATIVE — SIGNIFICANT CHANGE UP
BUN SERPL-MCNC: 30 MG/DL — HIGH (ref 7–23)
CALCIUM SERPL-MCNC: 7.5 MG/DL — LOW (ref 8.4–10.5)
CHLORIDE SERPL-SCNC: 113 MMOL/L — HIGH (ref 96–108)
CO2 SERPL-SCNC: 23 MMOL/L — SIGNIFICANT CHANGE UP (ref 22–31)
CREAT SERPL-MCNC: 1.33 MG/DL — HIGH (ref 0.5–1.3)
EOSINOPHIL # BLD AUTO: 0.37 K/UL — SIGNIFICANT CHANGE UP (ref 0–0.5)
EOSINOPHIL NFR BLD AUTO: 5.8 % — SIGNIFICANT CHANGE UP (ref 0–6)
FERRITIN SERPL-MCNC: 473 NG/ML — HIGH (ref 30–400)
GLUCOSE SERPL-MCNC: 140 MG/DL — HIGH (ref 70–99)
HCT VFR BLD CALC: 24.4 % — LOW (ref 39–50)
HCT VFR BLD CALC: 25.2 % — LOW (ref 39–50)
HCT VFR BLD CALC: 27 % — LOW (ref 39–50)
HGB BLD-MCNC: 7.8 G/DL — LOW (ref 13–17)
HGB BLD-MCNC: 8.1 G/DL — LOW (ref 13–17)
HGB BLD-MCNC: 8.5 G/DL — LOW (ref 13–17)
IMM GRANULOCYTES NFR BLD AUTO: 0.3 % — SIGNIFICANT CHANGE UP (ref 0–1.5)
IRON SATN MFR SERPL: 36 % — SIGNIFICANT CHANGE UP (ref 16–55)
IRON SATN MFR SERPL: 48 UG/DL — SIGNIFICANT CHANGE UP (ref 45–165)
LYMPHOCYTES # BLD AUTO: 0.8 K/UL — LOW (ref 1–3.3)
LYMPHOCYTES # BLD AUTO: 12.6 % — LOW (ref 13–44)
MCHC RBC-ENTMCNC: 31 PG — SIGNIFICANT CHANGE UP (ref 27–34)
MCHC RBC-ENTMCNC: 31.3 PG — SIGNIFICANT CHANGE UP (ref 27–34)
MCHC RBC-ENTMCNC: 31.5 GM/DL — LOW (ref 32–36)
MCHC RBC-ENTMCNC: 31.6 PG — SIGNIFICANT CHANGE UP (ref 27–34)
MCHC RBC-ENTMCNC: 32 GM/DL — SIGNIFICANT CHANGE UP (ref 32–36)
MCHC RBC-ENTMCNC: 32.1 GM/DL — SIGNIFICANT CHANGE UP (ref 32–36)
MCV RBC AUTO: 96.8 FL — SIGNIFICANT CHANGE UP (ref 80–100)
MCV RBC AUTO: 98.4 FL — SIGNIFICANT CHANGE UP (ref 80–100)
MCV RBC AUTO: 99.3 FL — SIGNIFICANT CHANGE UP (ref 80–100)
MONOCYTES # BLD AUTO: 0.38 K/UL — SIGNIFICANT CHANGE UP (ref 0–0.9)
MONOCYTES NFR BLD AUTO: 6 % — SIGNIFICANT CHANGE UP (ref 2–14)
NEUTROPHILS # BLD AUTO: 4.7 K/UL — SIGNIFICANT CHANGE UP (ref 1.8–7.4)
NEUTROPHILS NFR BLD AUTO: 74.4 % — SIGNIFICANT CHANGE UP (ref 43–77)
NRBC # BLD: 0 /100 WBCS — SIGNIFICANT CHANGE UP (ref 0–0)
PHOSPHATE SERPL-MCNC: 2.8 MG/DL — SIGNIFICANT CHANGE UP (ref 2.5–4.5)
PLATELET # BLD AUTO: 124 K/UL — LOW (ref 150–400)
PLATELET # BLD AUTO: 131 K/UL — LOW (ref 150–400)
PLATELET # BLD AUTO: 134 K/UL — LOW (ref 150–400)
POTASSIUM SERPL-MCNC: 3.9 MMOL/L — SIGNIFICANT CHANGE UP (ref 3.5–5.3)
POTASSIUM SERPL-SCNC: 3.9 MMOL/L — SIGNIFICANT CHANGE UP (ref 3.5–5.3)
PROT SERPL-MCNC: 5.2 G/DL — LOW (ref 6–8.3)
RBC # BLD: 2.52 M/UL — LOW (ref 4.2–5.8)
RBC # BLD: 2.56 M/UL — LOW (ref 4.2–5.8)
RBC # BLD: 2.72 M/UL — LOW (ref 4.2–5.8)
RBC # FLD: 13.7 % — SIGNIFICANT CHANGE UP (ref 10.3–14.5)
RETICS #: 38.3 K/UL — SIGNIFICANT CHANGE UP (ref 25–125)
RETICS/RBC NFR: 1.5 % — SIGNIFICANT CHANGE UP (ref 0.5–2.5)
RH IG SCN BLD-IMP: POSITIVE — SIGNIFICANT CHANGE UP
SODIUM SERPL-SCNC: 144 MMOL/L — SIGNIFICANT CHANGE UP (ref 135–145)
TIBC SERPL-MCNC: 132 UG/DL — LOW (ref 220–430)
TSH SERPL-MCNC: 0.75 UIU/ML — SIGNIFICANT CHANGE UP (ref 0.35–4.94)
UIBC SERPL-MCNC: 84 UG/DL — LOW (ref 110–370)
WBC # BLD: 5.96 K/UL — SIGNIFICANT CHANGE UP (ref 3.8–10.5)
WBC # BLD: 6.54 K/UL — SIGNIFICANT CHANGE UP (ref 3.8–10.5)
WBC # BLD: 6.87 K/UL — SIGNIFICANT CHANGE UP (ref 3.8–10.5)
WBC # FLD AUTO: 5.96 K/UL — SIGNIFICANT CHANGE UP (ref 3.8–10.5)
WBC # FLD AUTO: 6.54 K/UL — SIGNIFICANT CHANGE UP (ref 3.8–10.5)
WBC # FLD AUTO: 6.87 K/UL — SIGNIFICANT CHANGE UP (ref 3.8–10.5)

## 2021-01-11 PROCEDURE — 99223 1ST HOSP IP/OBS HIGH 75: CPT | Mod: GC

## 2021-01-11 PROCEDURE — 73700 CT LOWER EXTREMITY W/O DYE: CPT | Mod: 26,RT

## 2021-01-11 PROCEDURE — 99233 SBSQ HOSP IP/OBS HIGH 50: CPT

## 2021-01-11 PROCEDURE — 99232 SBSQ HOSP IP/OBS MODERATE 35: CPT | Mod: GC

## 2021-01-11 RX ORDER — THIAMINE MONONITRATE (VIT B1) 100 MG
500 TABLET ORAL EVERY 24 HOURS
Refills: 0 | Status: DISCONTINUED | OUTPATIENT
Start: 2021-01-11 | End: 2021-01-11

## 2021-01-11 RX ORDER — THIAMINE MONONITRATE (VIT B1) 100 MG
500 TABLET ORAL EVERY 24 HOURS
Refills: 0 | Status: DISCONTINUED | OUTPATIENT
Start: 2021-01-11 | End: 2021-01-12

## 2021-01-11 RX ORDER — METOPROLOL TARTRATE 50 MG
25 TABLET ORAL EVERY 12 HOURS
Refills: 0 | Status: DISCONTINUED | OUTPATIENT
Start: 2021-01-12 | End: 2021-01-14

## 2021-01-11 RX ORDER — METOPROLOL TARTRATE 50 MG
50 TABLET ORAL DAILY
Refills: 0 | Status: DISCONTINUED | OUTPATIENT
Start: 2021-01-11 | End: 2021-01-11

## 2021-01-11 RX ADMIN — ATORVASTATIN CALCIUM 80 MILLIGRAM(S): 80 TABLET, FILM COATED ORAL at 22:53

## 2021-01-11 RX ADMIN — TAMSULOSIN HYDROCHLORIDE 0.4 MILLIGRAM(S): 0.4 CAPSULE ORAL at 22:53

## 2021-01-11 RX ADMIN — Medication 5 MILLIGRAM(S): at 06:15

## 2021-01-11 RX ADMIN — ATROPA BELLADONNA AND OPIUM 1 SUPPOSITORY(S): 16.2; 6 SUPPOSITORY RECTAL at 22:54

## 2021-01-11 RX ADMIN — ATROPA BELLADONNA AND OPIUM 1 SUPPOSITORY(S): 16.2; 6 SUPPOSITORY RECTAL at 06:15

## 2021-01-11 RX ADMIN — FINASTERIDE 5 MILLIGRAM(S): 5 TABLET, FILM COATED ORAL at 17:15

## 2021-01-11 RX ADMIN — Medication 5 MILLIGRAM(S): at 22:53

## 2021-01-11 RX ADMIN — ATROPA BELLADONNA AND OPIUM 1 SUPPOSITORY(S): 16.2; 6 SUPPOSITORY RECTAL at 13:53

## 2021-01-11 RX ADMIN — Medication 81 MILLIGRAM(S): at 17:15

## 2021-01-11 RX ADMIN — Medication 2 MILLIGRAM(S): at 08:43

## 2021-01-11 RX ADMIN — Medication 50 MILLIGRAM(S): at 17:15

## 2021-01-11 RX ADMIN — Medication 105 MILLIGRAM(S): at 14:56

## 2021-01-11 NOTE — PROGRESS NOTE ADULT - PROBLEM SELECTOR PLAN 1
Normocytic anemia with iron labs consistent with AOCD (drop in Hb from 14--> 11s range 1/9--> 9.8 1/10--> 7.8-8.1 1/11 progressive decrease, no reported melena or hematochezia, but had hematuria which has since resolved), on exam with R-hip ecchymosis concern for possible localized hematoma 2/2 fall.   - F/u R-CT-hip r/o hematoma   - F/u CBC 10pm if trending downward would consider ICU consult in setting of beta-blocker masking tachycardia  - C/w active T&S q72h last 1/11   - C/w 2 large bore IVs

## 2021-01-11 NOTE — PROGRESS NOTE ADULT - PROBLEM SELECTOR PLAN 4
Retainedx4, s/p Batres insertion on 1/8 by urology.   - c/w Finasteride 5mg and Flomax 0.4mg added to regimen  - F/u urology recs    #Hematuria  S/p CBI by urology, since resolved.

## 2021-01-11 NOTE — PROGRESS NOTE ADULT - PROBLEM SELECTOR PLAN 8
F: None   E: Replete for K<4 Mag<2  N: DASH Diet  A: Holding in setting of anemia   Disposition: RMF    #Goals of care  - Patient with progressive dementia unable to participate in GOC discussion as per AM team.   - Reach out to patient's  (Divya Gutierrez [phone]) to determine if any medical directives.  - F/u palliative consult recs in AM

## 2021-01-11 NOTE — PROGRESS NOTE ADULT - PROBLEM SELECTOR PLAN 2
TME initially attributed to alcohol withdrawal, CTH neg, but s/p more collateral today, niece endorsed prior workup for progressive dementia (pending A establishment) and denied any prior substance/alcohol abuse. DDX likely progressive dementia and or/concurrent delirium vs less likely metabolic.   -  ammonia level WNL   - F/u TSH, B12, Folate  - F/u neurology consult in AM

## 2021-01-11 NOTE — PROGRESS NOTE ADULT - SUBJECTIVE AND OBJECTIVE BOX
SUBJECTIVE: Denies suprapubic/flank pain or fevers/chills       OBJECTIVE:    Vital Signs:  Vital Signs Last 24 Hrs  T(C): 36.8 (11 Jan 2021 06:13), Max: 36.8 (10 Bobby 2021 17:34)  T(F): 98.3 (11 Jan 2021 06:13), Max: 98.3 (11 Jan 2021 06:13)  HR: 81 (11 Jan 2021 06:13) (54 - 81)  BP: 144/72 (11 Jan 2021 06:13) (130/59 - 173/76)  BP(mean): 103 (11 Jan 2021 06:13) (85 - 109)  RR: 29 (11 Jan 2021 06:13) (17 - 29)  SpO2: 98% (11 Jan 2021 06:13) (96% - 99%)    Physical Exam:  General: NAD  Pulmonary: Nonlabored breathing, no respiratory distress  Abdominal: No suprapubic tenderness  : 22F 3-way catheter with 30cc in balloon with clear urine off CBI    Ins and Outs:  I&O's Summary    10 Bobby 2021 07:01  -  11 Jan 2021 07:00  --------------------------------------------------------  IN: 6073 mL / OUT: 3775 mL / NET: 2298 mL        Labs:                        9.8    7.17  )-----------( 124      ( 10 Bobby 2021 06:54 )             32.0     01-10    145  |  112<H>  |  31<H>  ----------------------------<  95  4.2   |  18<L>  |  1.22    Ca    8.1<L>      10 Bobby 2021 06:54  Mg     2.3     01-10      PTT - ( 10 Bobby 2021 06:54 )  PTT:27.1 sec    CAPILLARY BLOOD GLUCOSE

## 2021-01-11 NOTE — PROGRESS NOTE ADULT - PROBLEM SELECTOR PLAN 3
Initial EKG @[facility] w/out acute changes, repeat EKG@[facility] SR @85BPM w/ PACs and biphasic TW V2-V3 c/w Wellens sign. S/p medical management for NSTEMI given non-consentable for cardiac cath in light of AMS on admission. Trops peaked. S/p IV Heparin x 48hrs for NSTEMI.  -c/w ASA 81mg qd and Atorvastatin 80mg qd  -D/c Plavix 75mg qd given anemia   - Transition metoprolol tartrate 25mg BID to Toprol 50mg qd- will switch back to BID dosing in setting of anemia   - ECHO 1/8: Borderline normal LVSF- EF 50-55%, Grade 1LV diastolic dysfunction, mild TR.  - F/u cardiology recs

## 2021-01-11 NOTE — DIETITIAN INITIAL EVALUATION ADULT. - PROBLEM SELECTOR PLAN 5
afebrile, WBC 13.95 with slight left shift, Lactate 2.2 with improvement 1.2 after IVFs.  --CXR without acute infiltrates, COVID PCR negative, UA: large blood, >10 RBC, bacteria present, negative nitrite/LE and Blood cultures drawn-- results pending.  --received Azithromycin 500mg IV x 1 dose and Ceftriaxone 1g IV x 1 dose at [facility], will continue to monitor for now and discuss need for continuation of antibiotics in AM.

## 2021-01-11 NOTE — PROGRESS NOTE ADULT - ASSESSMENT
89YOM Poor historian with PMH of HTN, arthritis who was BIBEMS to [facility] on 1/7 s/p witness fall (collapsed with walker) with no subsequent LOC, but unclear head trauma,CTH neg, admitted to cardiac tele for NSTEMI (wellens sign on EKG/tropinemia) c/b HTN urgency with decision for medical treatment as not consentable (s/p 48h heparin gtt and transitioned to ASA/Plavix and Lipito 80qd). Course c/b urinary retention s/p whitt insertion by Urology with subsequent hematuria that has since resolved s/p CBI (whitt currently in place). Course also c/b normocytic anemia with iron labs consistent with AOCD (drop in Hb from 14--> 11s range 1/9--> 9.8 1/10--> 7.8-8.1 1/11 progressive decrease, no reported melena or hematochezia, but had hematuria which has since resolved), on exam with R-hip ecchymosis concern for possible localized hematoma 2/2 fall for which R-CT-hip ordered upon stepdown to [facility].

## 2021-01-11 NOTE — DIETITIAN INITIAL EVALUATION ADULT. - PROBLEM SELECTOR PLAN 6
Alcohol level <3, will initiate CIWA protocol.  --continue Thiamine 100mg PO daily, MVI and Folate.    VTE PPx: on Heparin gtt  PT consulted: F/U recs

## 2021-01-11 NOTE — DIETITIAN INITIAL EVALUATION ADULT. - PROBLEM SELECTOR PLAN 2
Initial EKG@[facility] without acute changes, repeat EKG@[facility] SR@85BPM with PACs and biphasic TW V2-V3 consistent with Wellens sign.   --Troponin I 0.241 and second set Troponin I 0.395.  --Troponin T 0.07, CKMB 9.7, , R/I NSTEMI, started on Heparin gtt per ACS protocol and loaded with Plavix 300mg PO x 1 dose as discussed with CCU fellow Dr. Higgins.  --Continue medical management with ASA/Plavix/Statin.  --Obtain Echocardiogram in AM.

## 2021-01-11 NOTE — DIETITIAN INITIAL EVALUATION ADULT. - PROBLEM SELECTOR PLAN 3
SBP 190s/100s upon arrival to [facility], improved to 130s/90s after Norvasc 5mg PO x 1 dose and 1 liter urine retrieved from straight cath.  --will start Metoprolol Tartrate 25mg PO BID.

## 2021-01-11 NOTE — PROGRESS NOTE ADULT - SUBJECTIVE AND OBJECTIVE BOX
CARDIOLOGY NP PROGRESS NOTE    Subjective:   Remainder ROS otherwise negative.    Overnight Events:     TELEMETRY:    EKG:      VITAL SIGNS:  T(C): 36.6 (01-11-21 @ 09:27), Max: 36.8 (01-10-21 @ 17:34)  HR: 69 (01-11-21 @ 11:26) (61 - 81)  BP: 139/64 (01-11-21 @ 11:26) (105/64 - 155/68)  RR: 22 (01-11-21 @ 11:26) (18 - 29)  SpO2: 99% (01-11-21 @ 11:26) (96% - 99%)  Wt(kg): --    I&O's Summary    10 Bobby 2021 07:01  -  11 Jan 2021 07:00  --------------------------------------------------------  IN: 6073 mL / OUT: 3775 mL / NET: 2298 mL    11 Jan 2021 07:01  -  11 Jan 2021 13:28  --------------------------------------------------------  IN: 200 mL / OUT: 0 mL / NET: 200 mL          PHYSICAL EXAM:    General: A/ox 3, No acute Distress  Neck: Supple, NO JVD  Cardiac: S1 S2, No M/R/G  Pulmonary: CTAB, Breathing unlabored, No Rhonchi/Rales/Wheezing  Abdomen: Soft, Non -tender, +BS x 4 quads  Extremities: No Rashes, No edema  Neuro: A/o x 3, No focal deficits          LABS:                          7.8    6.54  )-----------( 124      ( 11 Jan 2021 10:44 )             24.4                              01-11    144  |  113<H>  |  30<H>  ----------------------------<  140<H>  3.9   |  23  |  1.33<H>    Ca    7.5<L>      11 Jan 2021 10:44  Mg     2.3     01-10    TPro  5.2<L>  /  Alb  2.7<L>  /  TBili  0.9  /  DBili  x   /  AST  21  /  ALT  11  /  AlkPhos  56  01-11    LIVER FUNCTIONS - ( 11 Jan 2021 10:44 )  Alb: 2.7 g/dL / Pro: 5.2 g/dL / ALK PHOS: 56 U/L / ALT: 11 U/L / AST: 21 U/L / GGT: x                                 PTT - ( 10 Bobby 2021 06:54 )  PTT:27.1 sec  CAPILLARY BLOOD GLUCOSE                Allergies:  No Known Allergies    MEDICATIONS  (STANDING):  aspirin enteric coated 81 milliGRAM(s) Oral daily  atorvastatin 80 milliGRAM(s) Oral at bedtime  belladonna 16.2 mG/opium 30 mg Suppository 1 Suppository(s) Rectal every 8 hours  clopidogrel Tablet 75 milliGRAM(s) Oral daily  finasteride 5 milliGRAM(s) Oral daily  folic acid 1 milliGRAM(s) Oral daily  metoprolol succinate ER 50 milliGRAM(s) Oral daily  multivitamin 1 Tablet(s) Oral daily  oxybutynin 5 milliGRAM(s) Oral every 8 hours  sodium chloride 0.9%. 1000 milliLiter(s) (75 mL/Hr) IV Continuous <Continuous>  tamsulosin 0.4 milliGRAM(s) Oral at bedtime  thiamine IVPB 500 milliGRAM(s) IV Intermittent every 24 hours    MEDICATIONS  (PRN):  LORazepam   Injectable 2 milliGRAM(s) IV Push every 2 hours PRN Symptom-triggered: 2 point increase in CIWA -Ar score and a total score of 7 or LESS        DIAGNOSTIC TESTS:        CARDIOLOGY NP PROGRESS NOTE    Subjective: Pt seen and examined at bedside. Pt very agitated and trying to pull out IV and Batres catheter this morning despite penny wrist restraints in place, unable to be redirected w/ sitter and RN. Unable to answer questions or follow commands on exam given recent Ativan IV. Remainder ROS otherwise negative.    Overnight Events: CBI cleared hematuria yesterday w/ Batres remaining in place per Urology. Pt remained on penny wrist restraints for agitation/pulling at lines/Batres. S/p IV Ativan 2mg for CIWA protocol. Enhanced supervision w/ sitter at bedside.       TELEMETRY: SR 70s       VITAL SIGNS:  T(C): 36.6 (01-11-21 @ 09:27), Max: 36.8 (01-10-21 @ 17:34)  HR: 69 (01-11-21 @ 11:26) (61 - 81)  BP: 139/64 (01-11-21 @ 11:26) (105/64 - 155/68)  RR: 22 (01-11-21 @ 11:26) (18 - 29)  SpO2: 99% (01-11-21 @ 11:26) (96% - 99%)  Wt(kg): --    I&O's Summary    10 Bobby 2021 07:01  -  11 Jan 2021 07:00  --------------------------------------------------------  IN: 6073 mL / OUT: 3775 mL / NET: 2298 mL    11 Jan 2021 07:01  -  11 Jan 2021 13:28  --------------------------------------------------------  IN: 200 mL / OUT: 0 mL / NET: 200 mL          PHYSICAL EXAM:    General: A/ox 0, sleepy in bed, mumbling words in different language  Neck: Supple, NO JVD  Cardiac: S1 S2, No M/R/G  Pulmonary: CTAB, Breathing unlabored, No Rhonchi/Rales/Wheezing  Abdomen: Soft, Non -tender, +BS x 4 quads  Extremities: No Rashes, No edema. penny feet/toes soiled  Neuro: A/o x 0, No focal deficits          LABS:                          7.8    6.54  )-----------( 124      ( 11 Jan 2021 10:44 )             24.4                              01-11    144  |  113<H>  |  30<H>  ----------------------------<  140<H>  3.9   |  23  |  1.33<H>    Ca    7.5<L>      11 Jan 2021 10:44  Mg     2.3     01-10    TPro  5.2<L>  /  Alb  2.7<L>  /  TBili  0.9  /  DBili  x   /  AST  21  /  ALT  11  /  AlkPhos  56  01-11    LIVER FUNCTIONS - ( 11 Jan 2021 10:44 )  Alb: 2.7 g/dL / Pro: 5.2 g/dL / ALK PHOS: 56 U/L / ALT: 11 U/L / AST: 21 U/L / GGT: x         PTT - ( 10 Bobby 2021 06:54 )  PTT:27.1 sec  CAPILLARY BLOOD GLUCOSE            Allergies:  No Known Allergies    MEDICATIONS  (STANDING):  aspirin enteric coated 81 milliGRAM(s) Oral daily  atorvastatin 80 milliGRAM(s) Oral at bedtime  belladonna 16.2 mG/opium 30 mg Suppository 1 Suppository(s) Rectal every 8 hours  clopidogrel Tablet 75 milliGRAM(s) Oral daily  finasteride 5 milliGRAM(s) Oral daily  folic acid 1 milliGRAM(s) Oral daily  metoprolol succinate ER 50 milliGRAM(s) Oral daily  multivitamin 1 Tablet(s) Oral daily  oxybutynin 5 milliGRAM(s) Oral every 8 hours  sodium chloride 0.9%. 1000 milliLiter(s) (75 mL/Hr) IV Continuous <Continuous>  tamsulosin 0.4 milliGRAM(s) Oral at bedtime  thiamine IVPB 500 milliGRAM(s) IV Intermittent every 24 hours    MEDICATIONS  (PRN):  LORazepam   Injectable 2 milliGRAM(s) IV Push every 2 hours PRN Symptom-triggered: 2 point increase in CIWA -Ar score and a total score of 7 or LESS        DIAGNOSTIC TESTS:        CARDIOLOGY NP TRANSFER OFF SERVICE/ PROGRESS NOTE    HOSPITAL COURSE:  88 y/o male w hx of HTN, arthritis, ?ETOH abuse, who presents after a witnessed fall in the street on 1/7. Patient was brought to [facility] where he was found to be encephalopathic and unable to provide history, initially admitted to cardiology for Wellen's sign on EKG with trop of 0.07 and treated for NSTEMI with 48 hours heparin drip and downtrending cardiac enzymes. Found to have urinary retention s/p straight cath x 4, and Batres subsequently placed. However, he has remained encephalopathic, intermittently agitated, pulling out lines and pulling out his Batres and developed with a large amount of hematuria requiring CBI with Urology. Hematuria have cleared w/ CBI. Hgb downtrending to 7.8, repeat hgb stable at 8.1. Pt required Haldol/Ativan during hospitalization for agitation. Medicine consulted for transfer to [facility] for metabolic encephalopathy. 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 She has not seen him in 2-3 years but notes he has a  and was being considered for a home health aid but still currently leaves the house on his own. She also denies any known history of alcohol or substance abuse. Pt does not require further cardiac tele monitoring, and to be transferred to regional medical floor for further management.    Subjective: Pt seen and examined at bedside. Pt very agitated and trying to pull out IV and Batres catheter this morning despite penny wrist restraints in place, unable to be redirected w/ sitter and RN. Unable to answer questions or follow commands on exam given recent Ativan IV. Remainder ROS otherwise negative.    Overnight Events: CBI cleared hematuria yesterday w/ Batres remaining in place per Urology. Pt remained on penny wrist restraints for agitation/pulling at lines/Batres. S/p IV Ativan 2mg for CIWA protocol. Enhanced supervision w/ sitter at bedside.       TELEMETRY: SR 70s       VITAL SIGNS:  T(C): 36.6 (01-11-21 @ 09:27), Max: 36.8 (01-10-21 @ 17:34)  HR: 69 (01-11-21 @ 11:26) (61 - 81)  BP: 139/64 (01-11-21 @ 11:26) (105/64 - 155/68)  RR: 22 (01-11-21 @ 11:26) (18 - 29)  SpO2: 99% (01-11-21 @ 11:26) (96% - 99%)  Wt(kg): --    I&O's Summary    10 Bobby 2021 07:01  -  11 Jan 2021 07:00  --------------------------------------------------------  IN: 6073 mL / OUT: 3775 mL / NET: 2298 mL    11 Jan 2021 07:01  -  11 Jan 2021 13:28  --------------------------------------------------------  IN: 200 mL / OUT: 0 mL / NET: 200 mL          PHYSICAL EXAM:    General: A/ox 0, sleepy in bed, mumbling words in different language  Neck: Supple, NO JVD  Cardiac: S1 S2, No M/R/G  Pulmonary: CTAB, Breathing unlabored, No Rhonchi/Rales/Wheezing  Abdomen: Soft, Non -tender, +BS x 4 quads  Extremities: No Rashes, No edema. penny feet/toes soiled  Neuro: A/o x 0, No focal deficits          LABS:                          7.8    6.54  )-----------( 124      ( 11 Jan 2021 10:44 )             24.4                              01-11    144  |  113<H>  |  30<H>  ----------------------------<  140<H>  3.9   |  23  |  1.33<H>    Ca    7.5<L>      11 Jan 2021 10:44  Mg     2.3     01-10    TPro  5.2<L>  /  Alb  2.7<L>  /  TBili  0.9  /  DBili  x   /  AST  21  /  ALT  11  /  AlkPhos  56  01-11    LIVER FUNCTIONS - ( 11 Jan 2021 10:44 )  Alb: 2.7 g/dL / Pro: 5.2 g/dL / ALK PHOS: 56 U/L / ALT: 11 U/L / AST: 21 U/L / GGT: x         PTT - ( 10 Bobby 2021 06:54 )  PTT:27.1 sec  CAPILLARY BLOOD GLUCOSE            Allergies:  No Known Allergies    MEDICATIONS  (STANDING):  aspirin enteric coated 81 milliGRAM(s) Oral daily  atorvastatin 80 milliGRAM(s) Oral at bedtime  belladonna 16.2 mG/opium 30 mg Suppository 1 Suppository(s) Rectal every 8 hours  clopidogrel Tablet 75 milliGRAM(s) Oral daily  finasteride 5 milliGRAM(s) Oral daily  folic acid 1 milliGRAM(s) Oral daily  metoprolol succinate ER 50 milliGRAM(s) Oral daily  multivitamin 1 Tablet(s) Oral daily  oxybutynin 5 milliGRAM(s) Oral every 8 hours  sodium chloride 0.9%. 1000 milliLiter(s) (75 mL/Hr) IV Continuous <Continuous>  tamsulosin 0.4 milliGRAM(s) Oral at bedtime  thiamine IVPB 500 milliGRAM(s) IV Intermittent every 24 hours    MEDICATIONS  (PRN):  LORazepam   Injectable 2 milliGRAM(s) IV Push every 2 hours PRN Symptom-triggered: 2 point increase in CIWA -Ar score and a total score of 7 or LESS        DIAGNOSTIC TESTS:

## 2021-01-11 NOTE — PROGRESS NOTE ADULT - PROBLEM SELECTOR PLAN 5
Upon admission BUN/Creat 52/1.67. Baseline creatinine unknown. UA large blood, >10 RBC, bacteria present, negative nitrite/LE, FENA 1.6% c/w intrinsic renal disease. Improving.   - C/w BMP qd  - avoid nephrotoxic agents and renally dose meds.

## 2021-01-11 NOTE — PROGRESS NOTE ADULT - PROBLEM SELECTOR PLAN 2
Patient w/urinary retention since admission  - s/p 4 straight caths for which a  whitt was placed on 1/8  - c/w Finasteride 5mg and Flomax 0.4mg added to regimen    #Hematuria  - Patient pulled Whitt out 1/8 PM and significant hematuria noted   - urology following, appreciate recs.  - continue #22 3 way cath per urology with no CBI - hematuria cleared.   - placed on Oxybutynin 5mg q8h and belladona q8h ordered for bladder spasms During hospitalization, pt required Haldol/Ativan for agitation 1/9. Has wax and waning mental status. Intermittently tried to pull out IV and Batres catheter.    -DDx includes EtOH abuse, hepatic encephalopathy. No signs of sepsis or infection.  -Elio wrist restraints in place, attempt redirected w/ enhanced supervision sitter and RN  -CT head negative on admission  -c/w Thiamine 500mg x3d empirically. Continue MV/folate  - Continue CIWA protocol  - F/u ammonia level, TSH, B12, Folate  -Medicine consulted. F/u recs During hospitalization, pt required Haldol/Ativan for agitation 1/9. Has wax and waning mental status. Intermittently tried to pull out IV and Batres catheter.    -DDx includes EtOH abuse, hepatic encephalopathy. No signs of sepsis or infection.  -Elio wrist restraints in place, attempt redirected w/ enhanced supervision sitter and RN  -CT head negative on admission  -c/w Thiamine 500mg x3d empirically. Continue MV/folate  - Continue CIWA protocol  - F/u ammonia level, TSH, B12, Folate  -Medicine consulted. F/u recs  -Neurology consulted for delirium vs progressive dementia During hospitalization, pt required Haldol/Ativan for agitation 1/9. Has wax and waning mental status. Intermittently tried to pull out IV and Batres catheter.    -DDx includes EtOH abuse, hepatic encephalopathy. No signs of sepsis or infection.  -Elio wrist restraints in place, attempt redirected w/ enhanced supervision sitter and RN  -CT head negative on admission  -c/w Thiamine 500mg x3d empirically. Continue MV/folate  - D/c'ed Ativan for CIWA protocol given sedation.   - Per Medicine, rec Haldol or other antipsychotics given that dementia/delirium more likely than EtOH withdrawal based on collateral information. QTc 454ms  - F/u ammonia level, TSH, B12, Folate  -Medicine consulted. F/u recs  -Neurology consulted for delirium vs progressive dementia

## 2021-01-11 NOTE — DIETITIAN INITIAL EVALUATION ADULT. - OTHER CALCULATIONS
%IBW=97%; current body wt used for energy calculations as pt falls within % IBW, adjusted for age and current conditions; fluids per team

## 2021-01-11 NOTE — PROGRESS NOTE ADULT - PROBLEM SELECTOR PLAN 8
HX ETOH. 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  - c/w CIWA protocol   - Alcohol level <3  - U-tox negative.   - c/w Thiamine 500mg daily x3 days, MVI and Folate.    F: IV NaCl 0.9% @ 75cc/hr   N: DASH/TLC diet  E: Replete lytes PRN K<4, Mg<2  P: DVT PPX: on Lovenox SQ  C: FULL CODE  Dispo: Admit to tele [facility]; **Social work aware patient does not know where he lives and emergency contact that was listed did not recognize patient name and stated does not know him**  -pending PT eval HX ETOH. 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  - c/w CIWA protocol   - Alcohol level <3  - U-tox negative.   - c/w Thiamine 500mg daily x3 days, MVI and Folate.    F: IV NaCl 0.9% @ 75cc/hr   N: DASH/TLC diet  E: Replete lytes PRN K<4, Mg<2  P: DVT PPX: on Lovenox SQ  C: FULL CODE  Dispo: Admit to tele [facility]; **Social work aware patient does not know where he lives and emergency contact that was listed did not recognize patient name and stated does not know him**  -PT eval rec [facility] vs HPT

## 2021-01-11 NOTE — PROGRESS NOTE ADULT - PROBLEM SELECTOR PLAN 5
No known hx HTN. SBP 190s LHGV, improved 130s s/p Norvasc 5mg PO x 1.   - SBP 130s-140s  - c/w metoprolol tartrate 25mg BID S/P Witnessed fall by bystanders on the street. Per EMS report, pt was ambulating w/ walker. Denied prodrome/sx.   - CT head - no acute findings. Chronic microvessel ischemic changes.

## 2021-01-11 NOTE — PROGRESS NOTE ADULT - SUBJECTIVE AND OBJECTIVE BOX
TRANSFER ACCEPTANCE [facility] TO [facility]:   dd    SUBJECTIVE / INTERVAL HPI: Patient seen and examined at bedside.     VITAL SIGNS:  Vital Signs Last 24 Hrs  T(C): 36.8 (11 Jan 2021 16:28), Max: 36.8 (10 Bobby 2021 21:05)  T(F): 98.2 (11 Jan 2021 16:28), Max: 98.3 (11 Jan 2021 06:13)  HR: 80 (11 Jan 2021 17:13) (62 - 81)  BP: 150/76 (11 Jan 2021 17:13) (105/64 - 155/68)  BP(mean): 103 (11 Jan 2021 17:13) (81 - 103)  RR: 18 (11 Jan 2021 17:13) (18 - 29)  SpO2: 100% (11 Jan 2021 17:13) (97% - 100%)    PHYSICAL EXAM:    General: WDWN  HEENT: NC/AT; PERRL, anicteric sclera; MMM  Neck: supple  Cardiovascular: +S1/S2, RRR  Respiratory: CTA B/L; no W/R/R  Gastrointestinal: soft, NT/ND; +BSx4  Extremities: WWP; no edema, clubbing or cyanosis  Vascular: 2+ radial, DP/PT pulses B/L  Neurological: AAOx3; no focal deficits    MEDICATIONS:  MEDICATIONS  (STANDING):  aspirin enteric coated 81 milliGRAM(s) Oral daily  atorvastatin 80 milliGRAM(s) Oral at bedtime  belladonna 16.2 mG/opium 30 mg Suppository 1 Suppository(s) Rectal every 8 hours  finasteride 5 milliGRAM(s) Oral daily  folic acid 1 milliGRAM(s) Oral daily  metoprolol succinate ER 50 milliGRAM(s) Oral daily  multivitamin 1 Tablet(s) Oral daily  oxybutynin 5 milliGRAM(s) Oral every 8 hours  tamsulosin 0.4 milliGRAM(s) Oral at bedtime  thiamine IVPB 500 milliGRAM(s) IV Intermittent every 24 hours    MEDICATIONS  (PRN):      ALLERGIES:  Allergies    No Known Allergies    Intolerances        LABS:                        8.1    5.96  )-----------( 134      ( 11 Jan 2021 15:58 )             25.2     01-11    144  |  113<H>  |  30<H>  ----------------------------<  140<H>  3.9   |  23  |  1.33<H>    Ca    7.5<L>      11 Jan 2021 10:44  Phos  2.8     01-11  Mg     2.3     01-10    TPro  5.2<L>  /  Alb  2.7<L>  /  TBili  0.9  /  DBili  x   /  AST  21  /  ALT  11  /  AlkPhos  56  01-11    PTT - ( 10 Bobby 2021 06:54 )  PTT:27.1 sec    CAPILLARY BLOOD GLUCOSE          RADIOLOGY & ADDITIONAL TESTS: Reviewed. TRANSFER ACCEPTANCE [facility] TO [facility]:     89YOM Poor historian with PMH of HTN, arthritis who was BIBEMS to [facility] on 1/7 s/p witness fall (collapsed with walker) with no subsequent LOC, but unclear head trauma. Upon presentation only reporting dry mouth and urinary urgency on ROS but encephalopathic/poor historian. He was admitted to cardiac tele for NSTEMI (wellens sign on EKG/tropinemia) c/b HTN urgency with decision for medical treatment as not consentable (s/p 48h heparin gtt and transitioned to ASA/Plavix and Lipito 80qd). Course c/b urinary retention s/p whitt insertion by Urology with subsequent hematuria that has since resolved s/p CBI (whitt currently in place). For TME, initially attributed to alcohol withdrawal, CTH neg, but s/p more collateral today, niece endorsed prior workup for progressive dementia (pending [facility] establishment) and denied any prior substance/alcohol abuse, therefore CIWA d/c. Course also c/b normocytic anemia with iron labs consistent with AOCD (drop in Hb from 14--> 11s range 1/9--> 9.8 1/10--> 7.8-8.1 1/11 progressive decrease, no reported melena or hematochezia, but had hematuria which has since resolved), on exam with R-hip ecchymosis concern for possible localized hematoma 2/2 fall for which R-CT-hip ordered upon stepdown to [facility].     SUBJECTIVE / INTERVAL HPI: Patient seen and examined at bedside. Patient mumbling intermittently using english words, but otherwise as per nurse at bedside intermittently Bermudian vs Frisian, unable to understand with . Unable to obtain ROS as not replying to questions.     VITAL SIGNS:  Vital Signs Last 24 Hrs  T(C): 36.8 (11 Jan 2021 16:28), Max: 36.8 (10 Bobby 2021 21:05)  T(F): 98.2 (11 Jan 2021 16:28), Max: 98.3 (11 Jan 2021 06:13)  HR: 80 (11 Jan 2021 17:13) (62 - 81)  BP: 150/76 (11 Jan 2021 17:13) (105/64 - 155/68)  BP(mean): 103 (11 Jan 2021 17:13) (81 - 103)  RR: 18 (11 Jan 2021 17:13) (18 - 29)  SpO2: 100% (11 Jan 2021 17:13) (97% - 100%)    PHYSICAL EXAM:  General: Obese  male in NAD intermittently anxious appearing   HEENT: NC/AT; mucus memb dry   Neck: supple  Cardiovascular: +S1/S2, RRR no murmurs appreciated   Respiratory: CTA B/L on 2L NC    Gastrointestinal: soft, NT/ND; +BSx4  Hip: Right hip ecchymosis, extending to right upper thigh and lower abd/groin site, nontender to palpation   Extremities: WWP; no edema b/l   Vascular: 2+ radial, PT pulses B/L  Neurological: awake alert, not answering orientation questions, moving all extremities spontaneously but not to command     MEDICATIONS:  MEDICATIONS  (STANDING):  aspirin enteric coated 81 milliGRAM(s) Oral daily  atorvastatin 80 milliGRAM(s) Oral at bedtime  belladonna 16.2 mG/opium 30 mg Suppository 1 Suppository(s) Rectal every 8 hours  finasteride 5 milliGRAM(s) Oral daily  folic acid 1 milliGRAM(s) Oral daily  metoprolol succinate ER 50 milliGRAM(s) Oral daily  multivitamin 1 Tablet(s) Oral daily  oxybutynin 5 milliGRAM(s) Oral every 8 hours  tamsulosin 0.4 milliGRAM(s) Oral at bedtime  thiamine IVPB 500 milliGRAM(s) IV Intermittent every 24 hours    MEDICATIONS  (PRN):      ALLERGIES:  Allergies    No Known Allergies    Intolerances        LABS:                        8.1    5.96  )-----------( 134      ( 11 Jan 2021 15:58 )             25.2     01-11    144  |  113<H>  |  30<H>  ----------------------------<  140<H>  3.9   |  23  |  1.33<H>    Ca    7.5<L>      11 Jan 2021 10:44  Phos  2.8     01-11  Mg     2.3     01-10    TPro  5.2<L>  /  Alb  2.7<L>  /  TBili  0.9  /  DBili  x   /  AST  21  /  ALT  11  /  AlkPhos  56  01-11    PTT - ( 10 Bobby 2021 06:54 )  PTT:27.1 sec    CAPILLARY BLOOD GLUCOSE          RADIOLOGY & ADDITIONAL TESTS: Reviewed. TRANSFER ACCEPTANCE [facility] TO [facility]:     89YOM Poor historian with PMH of HTN, arthritis who was BIBEMS to [facility] on 1/7 s/p witness fall (collapsed with walker) with no subsequent LOC, but unclear head trauma. Upon presentation only reporting dry mouth and urinary urgency on ROS but encephalopathic/poor historian. He was admitted to cardiac tele for NSTEMI (wellens sign on EKG/tropinemia) c/b HTN urgency with decision for medical treatment as not consentable (s/p 48h heparin gtt and transitioned to ASA/Plavix and Lipito 80qd). Course c/b urinary retention s/p whitt insertion by Urology with subsequent hematuria that has since resolved s/p CBI (whitt currently in place). For TME, initially attributed to alcohol withdrawal, CTH neg, but s/p more collateral today, niece endorsed prior workup for progressive dementia (pending [facility] establishment) and denied any prior substance/alcohol abuse, therefore CIWA d/c. Course also c/b normocytic anemia with iron labs consistent with AOCD (drop in Hb from 14--> 11s range 1/9--> 9.8 1/10--> 7.8-8.1 1/11 progressive decrease, no reported melena or hematochezia, but had hematuria which has since resolved), on exam with R-hip ecchymosis concern for possible localized hematoma 2/2 fall for which R-CT-hip ordered upon stepdown to [facility].     SUBJECTIVE / INTERVAL HPI: Patient seen and examined at bedside. Patient mumbling intermittently using english words, but otherwise as per nurse at bedside intermittently Sierra Leonean vs German, unable to understand with . Unable to obtain ROS as not replying to questions.     VITAL SIGNS:  Vital Signs Last 24 Hrs  T(C): 36.8 (11 Jan 2021 16:28), Max: 36.8 (10 Bobby 2021 21:05)  T(F): 98.2 (11 Jan 2021 16:28), Max: 98.3 (11 Jan 2021 06:13)  HR: 80 (11 Jan 2021 17:13) (62 - 81)  BP: 150/76 (11 Jan 2021 17:13) (105/64 - 155/68)  BP(mean): 103 (11 Jan 2021 17:13) (81 - 103)  RR: 18 (11 Jan 2021 17:13) (18 - 29)  SpO2: 100% (11 Jan 2021 17:13) (97% - 100%)    PHYSICAL EXAM:  General: Obese  male in NAD intermittently anxious appearing   HEENT: NC/AT; mucus memb dry   Neck: supple  Cardiovascular: +S1/S2, RRR no murmurs appreciated   Respiratory: CTA B/L on 2L NC    Gastrointestinal: soft, NT/ND; +BSx4  Hip: Right hip ecchymosis, extending to right upper thigh and lower abd/groin site, nontender to palpation   Extremities: WWP; no edema b/l   Vascular: 2+ radial, PT pulses B/L  Neurological: awake alert, not answering orientation questions, moving all extremities spontaneously but not to command     MEDICATIONS:  MEDICATIONS  (STANDING):  aspirin enteric coated 81 milliGRAM(s) Oral daily  atorvastatin 80 milliGRAM(s) Oral at bedtime  belladonna 16.2 mG/opium 30 mg Suppository 1 Suppository(s) Rectal every 8 hours  finasteride 5 milliGRAM(s) Oral daily  folic acid 1 milliGRAM(s) Oral daily  metoprolol succinate ER 50 milliGRAM(s) Oral daily  multivitamin 1 Tablet(s) Oral daily  oxybutynin 5 milliGRAM(s) Oral every 8 hours  tamsulosin 0.4 milliGRAM(s) Oral at bedtime  thiamine IVPB 500 milliGRAM(s) IV Intermittent every 24 hours    MEDICATIONS  (PRN):      ALLERGIES:  Allergies    No Known Allergies    Intolerances        LABS:                        8.1    5.96  )-----------( 134      ( 11 Jan 2021 15:58 )             25.2     01-11    144  |  113<H>  |  30<H>  ----------------------------<  140<H>  3.9   |  23  |  1.33<H>    Ca    7.5<L>      11 Jan 2021 10:44  Phos  2.8     01-11  Mg     2.3     01-10    TPro  5.2<L>  /  Alb  2.7<L>  /  TBili  0.9  /  DBili  x   /  AST  21  /  ALT  11  /  AlkPhos  56  01-11    PTT - ( 10 Bobby 2021 06:54 )  PTT:27.1 sec    CAPILLARY BLOOD GLUCOSE        RADIOLOGY & ADDITIONAL TESTS: Reviewed.

## 2021-01-11 NOTE — PROGRESS NOTE ADULT - PROBLEM SELECTOR PLAN 6
PMH of HTN presenting initially sBP 190s currently normotensive.   - C/w lopressor 25 BID for NSTEMI

## 2021-01-11 NOTE — CONSULT NOTE ADULT - SUBJECTIVE AND OBJECTIVE BOX
HPI:    VITAL SIGNS:  Vital Signs Last 24 Hrs  T(C): 36.6 (11 Jan 2021 09:27), Max: 36.8 (10 Bobby 2021 17:34)  T(F): 97.8 (11 Jan 2021 09:27), Max: 98.3 (11 Jan 2021 06:13)  HR: 69 (11 Jan 2021 11:26) (54 - 81)  BP: 139/64 (11 Jan 2021 11:26) (105/64 - 155/68)  BP(mean): 92 (11 Jan 2021 11:26) (81 - 103)  RR: 22 (11 Jan 2021 11:26) (17 - 29)  SpO2: 99% (11 Jan 2021 11:26) (96% - 99%)    REVIEW OF SYSTEMS:    CONSTITUTIONAL: No weakness, fevers or chills.   EYES/ENT: No visual changes;  No vertigo or throat pain   NECK: No pain or stiffness  RESPIRATORY: No cough, wheezing, hemoptysis; No shortness of breath  CARDIOVASCULAR: No chest pain or palpitations  GASTROINTESTINAL: No abdominal or epigastric pain. No nausea, vomiting, or hematemesis; No diarrhea or constipation. No melena or hematochezia.  GENITOURINARY: No dysuria, frequency or hematuria  NEUROLOGICAL: No numbness or weakness  SKIN: No itching, burning, rashes, or lesions   All other review of systems is negative unless indicated above.    PHYSICAL EXAM:  General: WDWN  HEENT: NC/AT; PERRL, clear conjunctiva  Neck: supple, no JVD  Cardiovascular: +S1/S2; RRR, no M/R/G  Respiratory: CTA b/l; no W/R/R  Gastrointestinal: soft, NT/ND; +BSx4  Extremities: WWP; 2+ peripheral pulses; no edema   Neurological: AAOx3; no focal deficits    MEDICATIONS:  MEDICATIONS  (STANDING):  aspirin enteric coated 81 milliGRAM(s) Oral daily  atorvastatin 80 milliGRAM(s) Oral at bedtime  belladonna 16.2 mG/opium 30 mg Suppository 1 Suppository(s) Rectal every 8 hours  clopidogrel Tablet 75 milliGRAM(s) Oral daily  finasteride 5 milliGRAM(s) Oral daily  folic acid 1 milliGRAM(s) Oral daily  metoprolol succinate ER 50 milliGRAM(s) Oral daily  multivitamin 1 Tablet(s) Oral daily  oxybutynin 5 milliGRAM(s) Oral every 8 hours  sodium chloride 0.9%. 1000 milliLiter(s) (75 mL/Hr) IV Continuous <Continuous>  tamsulosin 0.4 milliGRAM(s) Oral at bedtime  thiamine IVPB 500 milliGRAM(s) IV Intermittent every 24 hours    MEDICATIONS  (PRN):  LORazepam   Injectable 2 milliGRAM(s) IV Push every 2 hours PRN Symptom-triggered: 2 point increase in CIWA -Ar score and a total score of 7 or LESS      ALLERGIES:  Allergies    No Known Allergies    Intolerances        LABS:                        7.8    6.54  )-----------( 124      ( 11 Jan 2021 10:44 )             24.4     01-11    144  |  113<H>  |  30<H>  ----------------------------<  140<H>  3.9   |  23  |  1.33<H>    Ca    7.5<L>      11 Jan 2021 10:44  Mg     2.3     01-10    TPro  5.2<L>  /  Alb  2.7<L>  /  TBili  0.9  /  DBili  x   /  AST  21  /  ALT  11  /  AlkPhos  56  01-11    PTT - ( 10 Bobby 2021 06:54 )  PTT:27.1 sec    CAPILLARY BLOOD GLUCOSE          RADIOLOGY & ADDITIONAL TESTS: Reviewed.           HPI: Patient is an 88 y/o male w hx of HTN, arthritis, pre-DM who presents after a witnessed fall in the street on 1/7. Patient was brought to [facility] where he was found to be encephalopathic and unable to provide history, initially admitted to cardiology for Wellen's sign on EKG with trop of 0.07 and treated for NSTEMI with 48 hours heparin drip and downtrending cardiac enzymes. However, he has remained encephalopathic, intermittently agitated, pulling out lines and pulling out his Btares with a large amount of hematuria requiring CBI with urology. 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 She has not seen him in 2-3 years but notes he has a  and was being considered for a home health aid but still currently leaves the house on his own. She also denies any history of alcohol or substance abuse. At the time of interview patient is heavily sedated and unable to provide a subjective history.     VITAL SIGNS:  Vital Signs Last 24 Hrs  T(C): 36.6 (11 Jan 2021 09:27), Max: 36.8 (10 Bobby 2021 17:34)  T(F): 97.8 (11 Jan 2021 09:27), Max: 98.3 (11 Jan 2021 06:13)  HR: 69 (11 Jan 2021 11:26) (54 - 81)  BP: 139/64 (11 Jan 2021 11:26) (105/64 - 155/68)  BP(mean): 92 (11 Jan 2021 11:26) (81 - 103)  RR: 22 (11 Jan 2021 11:26) (17 - 29)  SpO2: 99% (11 Jan 2021 11:26) (96% - 99%)    REVIEW OF SYSTEMS:  Unavailable.     PHYSICAL EXAM:  General: WDWN elderly male lying in bed, somnolent  HEENT: NC/AT; PERRL, clear conjunctiva  Neck: supple, no JVD  Cardiovascular: +S1/S2; RRR, no M/R/G  Respiratory: CTA b/l; no W/R/R. No accessory muscle use. No stridor.   Gastrointestinal: soft, NT/ND; +BSx4  : Batres in place draining clear yellow urine  Extremities: WWP; 2+ peripheral pulses; no edema   Neurological: Somnolent but opening eyes and responding to loud verbal stimuli, disoriented, moving all extremities spontaneously.     MEDICATIONS:  MEDICATIONS  (STANDING):  aspirin enteric coated 81 milliGRAM(s) Oral daily  atorvastatin 80 milliGRAM(s) Oral at bedtime  belladonna 16.2 mG/opium 30 mg Suppository 1 Suppository(s) Rectal every 8 hours  clopidogrel Tablet 75 milliGRAM(s) Oral daily  finasteride 5 milliGRAM(s) Oral daily  folic acid 1 milliGRAM(s) Oral daily  metoprolol succinate ER 50 milliGRAM(s) Oral daily  multivitamin 1 Tablet(s) Oral daily  oxybutynin 5 milliGRAM(s) Oral every 8 hours  sodium chloride 0.9%. 1000 milliLiter(s) (75 mL/Hr) IV Continuous <Continuous>  tamsulosin 0.4 milliGRAM(s) Oral at bedtime  thiamine IVPB 500 milliGRAM(s) IV Intermittent every 24 hours    MEDICATIONS  (PRN):  LORazepam   Injectable 2 milliGRAM(s) IV Push every 2 hours PRN Symptom-triggered: 2 point increase in CIWA -Ar score and a total score of 7 or LESS      ALLERGIES:  Allergies    No Known Allergies    Intolerances        LABS:                        7.8    6.54  )-----------( 124      ( 11 Jan 2021 10:44 )             24.4     01-11    144  |  113<H>  |  30<H>  ----------------------------<  140<H>  3.9   |  23  |  1.33<H>    Ca    7.5<L>      11 Jan 2021 10:44  Mg     2.3     01-10    TPro  5.2<L>  /  Alb  2.7<L>  /  TBili  0.9  /  DBili  x   /  AST  21  /  ALT  11  /  AlkPhos  56  01-11    PTT - ( 10 Bobby 2021 06:54 )  PTT:27.1 sec    CAPILLARY BLOOD GLUCOSE          RADIOLOGY & ADDITIONAL TESTS: Reviewed.

## 2021-01-11 NOTE — PROGRESS NOTE ADULT - ASSESSMENT
Assessment: 89M PMH ETOH abuse, s/p witnessed fall, admitted for medical management of NSTEMI (non- consentable for cath), hospital course c/b urinary retention and hematuria 2/2 patient pulling out whitt catheter, requiring CBI. Patient's urine is clear off CBI overnight, with minimal clot this morning on manual irrigation. Patient's urine culture 1/08/21 shows no growth.    Recommendations:  - Ensure tolerating diet, regular bowel function, and ambulation before next TOV  - Check post void residual and replace/discharge patient with catheter if retaining >150cc of urine  - Continue care per primary team  - No urologic surgery intervention at this time

## 2021-01-11 NOTE — PROGRESS NOTE ADULT - PROBLEM SELECTOR PLAN 4
S/P Witnessed fall by bystanders on the street; denies prodroma; sx   - CT head - no acute findings. Chronic microvessel ischemic changes. On admission, BUN/Creat 52/1.67. Baseline creatinine unknown.   - Improving crea to 1.33  - UA large blood, >10 RBC, bacteria present, negative nitrite/LE.  - FENA 1.6% c/w intrinsic renal disease  - consider renal ultrasound if kidney function does not improve   - avoid nephrotoxic agents and renally dose meds

## 2021-01-11 NOTE — DIETITIAN INITIAL EVALUATION ADULT. - ADD RECOMMEND
Monitor %PO intake, diet tolerance, s/s of issues chewing/swallowing. Should pt be noted with s/s of issues swallowing, recommend NPO/SLP. Monitor Wts, GI, GOC.

## 2021-01-11 NOTE — PROGRESS NOTE ADULT - ASSESSMENT
88 y/o Czech/ Azerbaijani/English speaking M, hx of ETOH abuse, POOR HISTORIAN, BIBEMS to [facility] 1/7/20 s/p witnessed fall, found to have Wellen's sign and elevated trop (peaked 0.07) admitted to tele [facility] for medical mgmt NSTEMI (non-consentable for cath); hospital course c/b urinary retention, requiring Batres and noted hematuria which has cleared with CBI. Urology following. Stable; pending discharge impediments.        89 y/o Egyptian/ Ukrainian/English speaking M, hx of ETOH abuse, POOR HISTORIAN, BIBEMS to [facility] 1/7/20 s/p witnessed fall while walking w/ walker on street, found to have Wellen's sign and elevated trop (peaked 0.07) admitted to [address] for medical mgmt NSTEMI (non-consentable for cath). Hospital course c/b metabolic encephalopathy, urinary retention requiring Batres placement, developed hematuria after pulling out Batres, now  cleared with CBI. Urology following. Medicine consulted for transfer to [facility] for further management.     88 y/o New Zealander/English speaking M, hx of ? ETOH abuse, HTN, arthiritis, POOR HISTORIAN, BIBEMS to [facility] 1/7/20 s/p witnessed fall while walking w/ walker on street, found to have Wellen's sign and elevated trop (peaked 0.07) admitted to [address] for medical mgmt NSTEMI (non-consentable for cath). Hospital course c/b metabolic encephalopathy, urinary retention requiring Batres placement, developed hematuria after pulling out Batres, now  cleared with CBI. Urology following. Medicine consulted for transfer to [facility] for further management.

## 2021-01-11 NOTE — DIETITIAN INITIAL EVALUATION ADULT. - OTHER INFO
89 yr old British/English speaking M, hx of ETOH abuse, POOR HISTORIAN, BIBEMS to [facility] 1/7/20 s/p witnessed fall by bystanders. initial EKG SR without acute ischemic changes, repeat EKG@[facility] SR with biphasic TW V2-V3 (consistent with Wellen's sign), Troponin T 0.07, CKMB 9.7, . Pt R/I NSTEMI, transferred to [facility] 5LACH for cardiac telemetry, serial cardiac enzymes and further ischemic work-up. Pt unable to consent for [facility]. ECHO 1/8: Borderline normal LVSF- EF 50-55%, Grade 1LV diastolic dysfunction, mild TR. Pt hospital course c/b urinary retention, requiring Batres and noted hematuria which has cleared with CBI. Pt D/C pending Social issues. No pain. Victor Manuel 16. No edema/pressure ulcers; +Skin Tear. BM 1/10, ND, Soft, NT.   Pt confused on 5LA, CIWA 6. 89 yr old Citizen of [place]/English speaking M, hx of ETOH abuse, POOR HISTORIAN, BIBEMS to [facility] 1/7/20 s/p witnessed fall by bystanders. initial EKG SR without acute ischemic changes, repeat EKG@[facility] SR with biphasic TW V2-V3 (consistent with Wellen's sign), Troponin T 0.07, CKMB 9.7, . Pt R/I NSTEMI, transferred to [facility] 5LACH for cardiac telemetry, serial cardiac enzymes and further ischemic work-up. Pt unable to consent for [facility]. ECHO 1/8: Borderline normal LVSF- EF 50-55%, Grade 1LV diastolic dysfunction, mild TR. Pt hospital course c/b urinary retention, requiring Batres and noted hematuria which has cleared with CBI. Pt D/C pending Social issues. No pain. Victor Manuel 16. No edema/pressure ulcers; +Skin Tear. BM 1/10, ND, Soft, NT.   Pt confused on 5LA, CIWA 6, seen on restraints. No prior RD notes. Spoke with RN, room one to one and attended IDR. Per IDR possible transfer to medicine Pending. Room one to one reports pt consumed 50% at breakfast today, denies issues chewing/swallowing During Meal. RN reports crushing meds for ease 2/2 use of restraints. Unable to assess %PO intake or wt Hx PTA 2/2 pt confusion at this time.   Please see below for nutritions recommendations.

## 2021-01-11 NOTE — PROGRESS NOTE ADULT - PROBLEM SELECTOR PLAN 6
IMPROVED. WBC 7.17 1/10. WBC 13.95 on admission w/slight left shift. CXR w/out acute infiltrates. COVID negative. UA large  blood, >10 RBC, bacteria present, negative nitrite/LE.   - Lactate 2.2 --> 1.2 s/p IVF   - s/p IV Azithromycin 500mg  x 1  and [facility]  - remains afebrile and non toxic appearing. No need for further abx as d/w Dr. Nagel No known hx HTN. SBP 190s [facility], improved 130s s/p Norvasc 5mg PO x 1.   - -150s  - c/w metoprolol succinate 50mg qd No known hx HTN. SBP 190s [facility], improved 130s s/p Norvasc 5mg PO x 1.   - -150s  - c/w metoprolol succinate 50mg qd    #Anemia  -Hgb 14 on admission, downtrending to 7.8 today.   -Episodes of hematuria s/p pulled out Batres, CBI cleared on 1/9.  -F/u iron studies, retic count, folate, B12, peripheral smear  -Plan to transfuse PRBC x 1 unit given NSTEMI, keep Hgb >8

## 2021-01-11 NOTE — PROGRESS NOTE ADULT - PROBLEM SELECTOR PLAN 7
Fall upon presentation unclear if mechanical as denied prodromal symptoms as per H&P, CT head neg for acute process.   - F/u workup for TME, likely 2/2 progressive dementia   - Subsequent R-hip hematoma upon presentation reportedly- plan as above

## 2021-01-11 NOTE — DIETITIAN INITIAL EVALUATION ADULT. - PROBLEM SELECTOR PLAN 4
BUN/Cr 52/1.67, baseline Cr unknown.  --UA large blood, >10 RBC, bacteria present, negative nitrite/LE. Will obtain Urine electrolytes.  --Noted to have urinary retention at [facility] with 1L retrieved during straight cath, will repeat bladder scan.

## 2021-01-11 NOTE — DIETITIAN INITIAL EVALUATION ADULT. - PROBLEM SELECTOR PLAN 1
witnessed fall by bystanders on street, patient denies prodromal symptoms.   --CT head negative for acute findings.

## 2021-01-11 NOTE — PROGRESS NOTE ADULT - PROBLEM SELECTOR PLAN 1
Initial EKG@[facility] w/out acute changes, repeat EKG@[facility] SR@85BPM w/PACs and biphasic TW V2-V3 c/w Wellens sign.   - Trop I 0.241 --> 0.395. Trop T peaked 0.07  - Chol 140 TG 41 HDL 62 LDL 70; hgba1c 5.3  -c/w Plavix 75mg qd   -c/w ASA 81mg and Atorvastatin 80mg   - c/w metoprolol tartrate 25mg BID   - ECHO 1/8: Borderline normal LVSF- EF 50-55%, Grade 1LV diastolic dysfunction, mild TR. Initial EKG @[facility] w/out acute changes, repeat EKG@[facility] SR @85BPM w/ PACs and biphasic TW V2-V3 c/w Wellens sign. S/p medical management for NSTEMI given non-consentable for cardiac cath in light of AMS on admission.  - Trop I 0.241 --> 0.395. Trop T peaked 0.07  - Chol 140 TG 41 HDL 62 LDL 70; HgA1c 5.3  -S/p IV Heparin x 48hrs for NSTEMI medical management  -c/w ASA 81mg qd, Plavix 75mg qd and Atorvastatin 80mg qd  - Transition metoprolol tartrate 25mg BID to Toprol 50mg qd  - ECHO 1/8: Borderline normal LVSF- EF 50-55%, Grade 1LV diastolic dysfunction, mild TR. Initial EKG @[facility] w/out acute changes, repeat EKG@[facility] SR @85BPM w/ PACs and biphasic TW V2-V3 c/w Wellens sign. S/p medical management for NSTEMI given non-consentable for cardiac cath in light of AMS on admission.  - Trop I 0.241 --> 0.395. Trop T peaked 0.07  - Chol 140 TG 41 HDL 62 LDL 70; HgA1c 5.3  -S/p IV Heparin x 48hrs for NSTEMI medical management  -c/w ASA 81mg qd and Atorvastatin 80mg qd  -D/c Plavix 75mg qd given acute on chronic anemia, Hgb 7.8  - Transition metoprolol tartrate 25mg BID to Toprol 50mg qd  - ECHO 1/8: Borderline normal LVSF- EF 50-55%, Grade 1LV diastolic dysfunction, mild TR.

## 2021-01-11 NOTE — PROGRESS NOTE ADULT - PROBLEM SELECTOR PLAN 7
HX ETOH. Most likely has ETOH induced Delirium  - c/w CIWA protocol   - Alcohol level <3  - U-tox negative.   - c/w Thiamine 100mg PO daily, MVI and Folate.    F: IV NaCl 0.9% @ 75cc/hr   N: DASH/TLC/DM diet  E: Replete lytes PRN K<4, Mg<2  P: DVT PPX: on IV hep gtt   C: FULL CODE  Dispo: Admit to tele [facility]; **Social work aware patient does not know where he lives and emergency contact that was listed did not recognize patient name and stated does not know him**    pending PT eval RESOLVED. WBC 7.17 1/10. WBC 13.95 on admission w/slight left shift. CXR w/out acute infiltrates. COVID negative. UA large  blood, >10 RBC, bacteria present, negative nitrite/LE.   - Lactate 2.2 --> 1.2 s/p IVF   - s/p IV Azithromycin 500mg  x 1  and [facility]  - remains afebrile and non toxic appearing. No need for further abx as d/w Dr. Nagel

## 2021-01-11 NOTE — CONSULT NOTE ADULT - ASSESSMENT
89 y/o Chadian speaking male with ?hx EtOH abuse who presents after witnessed fall in street, BIBEMS. Found to have EKG findings c/f NSTEMI, s/p medical management given unconsentable, now for transfer to medicine for encephalopathy.    #Metabolic encephalopathy  DDx includes EtOH abuse, hepatic encephalopathy. No signs of sepsis.   - CTH neg on arrival. Kno  - Only received 100mg thiamine x3d. Would start 500mg x3d empirically. Continue MV/folate  - Continue CIWA protocol  - Send ammonia level  - Send TSH, B12, Folate    #EtOH abuse  - As above    #NSTEMI  - Mgmt per cardiology service  - S/p medical mgmt with 48h heparin gtt  - Continue DAPT, atorvastatin, metop succinate 50qd  - Consider starting ACE if BP tolerates and renal function stable.     #NANCY vs CKD  Improving. No collateral for previous labs.     #Urinary retention  - C/w Batres  - C/w finasteride, flomax  - Urology following    #HTN  - Continue metop succinate 50mg po qd  - Consider starting ACE if BP tolerates and renal function stable.        88 y/o Nauruan speaking male with ?hx EtOH abuse who presents after witnessed fall in street, BIBEMS. Found to have EKG findings c/f NSTEMI, s/p medical management given unconsentable, now for transfer to medicine for encephalopathy.    #Metabolic encephalopathy  Per collateral from niece, most likely differential is progression of known dementia, likely Alzheimer dementia. Also on ddx is metabolic abnormalities, other primary neurologic process. Less likely wernicke-korsakoff. Daughter states no EtOH use but has not seen pt in 2-3 years.   - Recommend neurology consult  - CTH neg on arrival. Kno  - Only received 100mg thiamine x3d. Would start 500mg x3d empirically as benefit outweighs risk. Continue MV/folate for now pending further collateral  - Discontinue CIWA protocol  - Avoid ativan if at all possible. Much prefer haldol or other antipsychotics given that dementia/delirium more likely than EtOH withdrawal based on collateral information. Make sure EKG up to date.  - Send ammonia level  - Send TSH, B12, Folate    #Anemia  Most likely blood loss, noted large amount of bleeding from Batres after traumatic removal. Now stabilized at 8.1. No PAO noted. Passed one nonbloody BM yesterday.   - Given rapid downtrend, obtain one additional CBC at 10pm prior to AM labs.   - Transfuse to goal 8.1  - Obtain B12, folate for macrocytosis  - If still downtrending despite resolution of hematuria and no hematochezia/melena, consider CT for RP bleed on DAPT.   - Maintain two active T+S and large bore IV access.     #Thrombocytopenia  May be consumptive due to significant bleeding.   - Continue to trend CBC daily.     #NSTEMI  - Mgmt per cardiology service  - S/p medical mgmt with 48h heparin gtt  - Continue DAPT, atorvastatin, metop succinate 50qd  - Consider starting ACE if BP tolerates and renal function stable.     #NANCY vs CKD  Improving. No collateral for previous labs.     #Urinary retention  - C/w Batres  - C/w finasteride, flomax  - Urology following    #HTN  - Continue metop succinate 50mg po qd  - Consider starting ACE if BP tolerates and renal function stable.   - If hemoglobin downtrends again, would hold both.     #Goals of care  - Patient with progressive dementia unable to participate in GOC discussion  - Reach out to patient's  (Divya Gutierrez [phone]) to determine if any medical directives, consider palliative consult     88 y/o Indian speaking male with ?hx EtOH abuse who presents after witnessed fall in street, BIBEMS. Found to have EKG findings c/f NSTEMI, s/p medical management given unconsentable, now for transfer to medicine for encephalopathy.    #Metabolic encephalopathy  Per collateral from niece, most likely differential is progression of known dementia, likely Alzheimer dementia. Also on ddx is metabolic abnormalities, other primary neurologic process. Less likely wernicke-korsakoff. Daughter states no EtOH use but has not seen pt in 2-3 years.   - Recommend neurology consult  - CTH neg on arrival. Kno  - Only received 100mg thiamine x3d. Would start 500mg x3d empirically as benefit outweighs risk. Continue MV/folate for now pending further collateral  - Discontinue CIWA protocol  - Avoid ativan if at all possible. Much prefer haldol or other antipsychotics given that dementia/delirium more likely than EtOH withdrawal based on collateral information. Make sure EKG up to date.  - Send ammonia level  - Send TSH, B12, Folate    #Anemia  Most likely blood loss, noted large amount of bleeding from Batres after traumatic removal. Now stabilized at 8.1. No PAO noted. Passed one nonbloody BM yesterday.   - Given rapid downtrend, obtain one additional CBC at 10pm prior to AM labs.   - Transfuse to goal 8.1  - Obtain B12, folate for macrocytosis  - Will check CT hip given concern for bleed  - Maintain two active T+S and large bore IV access.     #Thrombocytopenia  May be consumptive due to significant bleeding.   - Continue to trend CBC daily.     #NSTEMI  - Mgmt per cardiology service  - S/p medical mgmt with 48h heparin gtt  - Continue DAPT, atorvastatin, metop succinate 50qd  - Consider starting ACE if BP tolerates and renal function stable.     #NANCY vs CKD  Improving. No collateral for previous labs.     #Urinary retention  - C/w Batres  - C/w finasteride, flomax  - Urology following    #HTN  - Continue metop succinate 50mg po qd  - Consider starting ACE if BP tolerates and renal function stable.   - If hemoglobin downtrends again, would hold both.     #Goals of care  - Patient with progressive dementia unable to participate in GOC discussion  - Reach out to patient's  (Divya Gutierrez [phone]) to determine if any medical directives, consider palliative consult

## 2021-01-11 NOTE — PROGRESS NOTE ADULT - PROBLEM SELECTOR PLAN 3
IMPROVED. 1.22 1/10/21. On admission, BUN/Creat 52/1.67. Baseline creatinine unknown.   - UA large blood, >10 RBC, bacteria present, negative nitrite/LE.  - FENA 1.6% c/w intrinsic renal disease  - consider renal ultrasound if kidney function does not improve   - avoid nephrotoxic agents and renally dose meds Patient w/ urinary retention since admission  - s/p 4 straight caths for which a Batres was placed on 1/8  - c/w Finasteride 5mg and Flomax 0.4mg added to regimen    #Hematuria  - Patient pulled Batres out 1/8 PM and significant hematuria noted   - Urology following, appreciate recs.  - continue #22 3 way cath per urology with no CBI - hematuria cleared.   - placed on Oxybutynin 5mg q8h and belladona q8h ordered for bladder spasms

## 2021-01-12 DIAGNOSIS — F03.90 UNSPECIFIED DEMENTIA, UNSPECIFIED SEVERITY, WITHOUT BEHAVIORAL DISTURBANCE, PSYCHOTIC DISTURBANCE, MOOD DISTURBANCE, AND ANXIETY: ICD-10-CM

## 2021-01-12 DIAGNOSIS — Z51.5 ENCOUNTER FOR PALLIATIVE CARE: ICD-10-CM
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
## 2021-01-12 DIAGNOSIS — R45.1 RESTLESSNESS AND AGITATION: ICD-10-CM

## 2021-01-12 DIAGNOSIS — R53.81 OTHER MALAISE: ICD-10-CM

## 2021-01-12 LAB
ALBUMIN SERPL ELPH-MCNC: 2.9 G/DL — LOW (ref 3.3–5)
ALP SERPL-CCNC: 67 U/L — SIGNIFICANT CHANGE UP (ref 40–120)
ALT FLD-CCNC: 15 U/L — SIGNIFICANT CHANGE UP (ref 10–45)
ANION GAP SERPL CALC-SCNC: 8 MMOL/L — SIGNIFICANT CHANGE UP (ref 5–17)
AST SERPL-CCNC: 25 U/L — SIGNIFICANT CHANGE UP (ref 10–40)
BILIRUB SERPL-MCNC: 0.8 MG/DL — SIGNIFICANT CHANGE UP (ref 0.2–1.2)
BUN SERPL-MCNC: 25 MG/DL — HIGH (ref 7–23)
CALCIUM SERPL-MCNC: 8.1 MG/DL — LOW (ref 8.4–10.5)
CHLORIDE SERPL-SCNC: 112 MMOL/L — HIGH (ref 96–108)
CO2 SERPL-SCNC: 25 MMOL/L — SIGNIFICANT CHANGE UP (ref 22–31)
CREAT SERPL-MCNC: 1.2 MG/DL — SIGNIFICANT CHANGE UP (ref 0.5–1.3)
FOLATE SERPL-MCNC: 9.2 NG/ML — SIGNIFICANT CHANGE UP
GLUCOSE SERPL-MCNC: 131 MG/DL — HIGH (ref 70–99)
HCT VFR BLD CALC: 26.3 % — LOW (ref 39–50)
HCT VFR BLD CALC: 28.6 % — LOW (ref 39–50)
HGB BLD-MCNC: 8.2 G/DL — LOW (ref 13–17)
HGB BLD-MCNC: 9 G/DL — LOW (ref 13–17)
MAGNESIUM SERPL-MCNC: 2 MG/DL — SIGNIFICANT CHANGE UP (ref 1.6–2.6)
MCHC RBC-ENTMCNC: 31.2 GM/DL — LOW (ref 32–36)
MCHC RBC-ENTMCNC: 31.2 PG — SIGNIFICANT CHANGE UP (ref 27–34)
MCHC RBC-ENTMCNC: 31.5 GM/DL — LOW (ref 32–36)
MCHC RBC-ENTMCNC: 31.7 PG — SIGNIFICANT CHANGE UP (ref 27–34)
MCV RBC AUTO: 100 FL — SIGNIFICANT CHANGE UP (ref 80–100)
MCV RBC AUTO: 100.7 FL — HIGH (ref 80–100)
NRBC # BLD: 0 /100 WBCS — SIGNIFICANT CHANGE UP (ref 0–0)
NRBC # BLD: 0 /100 WBCS — SIGNIFICANT CHANGE UP (ref 0–0)
PHOSPHATE SERPL-MCNC: 2.9 MG/DL — SIGNIFICANT CHANGE UP (ref 2.5–4.5)
PLATELET # BLD AUTO: 135 K/UL — LOW (ref 150–400)
PLATELET # BLD AUTO: 137 K/UL — LOW (ref 150–400)
POTASSIUM SERPL-MCNC: 4.3 MMOL/L — SIGNIFICANT CHANGE UP (ref 3.5–5.3)
POTASSIUM SERPL-SCNC: 4.3 MMOL/L — SIGNIFICANT CHANGE UP (ref 3.5–5.3)
PROT SERPL-MCNC: 5.4 G/DL — LOW (ref 6–8.3)
RBC # BLD: 2.63 M/UL — LOW (ref 4.2–5.8)
RBC # BLD: 2.84 M/UL — LOW (ref 4.2–5.8)
RBC # FLD: 13.6 % — SIGNIFICANT CHANGE UP (ref 10.3–14.5)
RBC # FLD: 13.6 % — SIGNIFICANT CHANGE UP (ref 10.3–14.5)
SODIUM SERPL-SCNC: 145 MMOL/L — SIGNIFICANT CHANGE UP (ref 135–145)
VIT B12 SERPL-MCNC: 499 PG/ML — SIGNIFICANT CHANGE UP (ref 232–1245)
WBC # BLD: 6.48 K/UL — SIGNIFICANT CHANGE UP (ref 3.8–10.5)
WBC # BLD: 7.77 K/UL — SIGNIFICANT CHANGE UP (ref 3.8–10.5)
WBC # FLD AUTO: 6.48 K/UL — SIGNIFICANT CHANGE UP (ref 3.8–10.5)
WBC # FLD AUTO: 7.77 K/UL — SIGNIFICANT CHANGE UP (ref 3.8–10.5)

## 2021-01-12 PROCEDURE — 99223 1ST HOSP IP/OBS HIGH 75: CPT

## 2021-01-12 PROCEDURE — 99222 1ST HOSP IP/OBS MODERATE 55: CPT

## 2021-01-12 PROCEDURE — 99358 PROLONG SERVICE W/O CONTACT: CPT

## 2021-01-12 PROCEDURE — 99222 1ST HOSP IP/OBS MODERATE 55: CPT | Mod: GC

## 2021-01-12 PROCEDURE — 99233 SBSQ HOSP IP/OBS HIGH 50: CPT | Mod: GC

## 2021-01-12 RX ORDER — OLANZAPINE 15 MG/1
2.5 TABLET, FILM COATED ORAL EVERY 24 HOURS
Refills: 0 | Status: DISCONTINUED | OUTPATIENT
Start: 2021-01-12 | End: 2021-01-14

## 2021-01-12 RX ORDER — HALOPERIDOL DECANOATE 100 MG/ML
5 INJECTION INTRAMUSCULAR ONCE
Refills: 0 | Status: COMPLETED | OUTPATIENT
Start: 2021-01-12 | End: 2021-01-12

## 2021-01-12 RX ADMIN — TAMSULOSIN HYDROCHLORIDE 0.4 MILLIGRAM(S): 0.4 CAPSULE ORAL at 22:31

## 2021-01-12 RX ADMIN — ATROPA BELLADONNA AND OPIUM 1 SUPPOSITORY(S): 16.2; 6 SUPPOSITORY RECTAL at 13:15

## 2021-01-12 RX ADMIN — Medication 5 MILLIGRAM(S): at 13:15

## 2021-01-12 RX ADMIN — Medication 1 MILLIGRAM(S): at 11:27

## 2021-01-12 RX ADMIN — Medication 5 MILLIGRAM(S): at 22:31

## 2021-01-12 RX ADMIN — Medication 5 MILLIGRAM(S): at 06:51

## 2021-01-12 RX ADMIN — FINASTERIDE 5 MILLIGRAM(S): 5 TABLET, FILM COATED ORAL at 11:27

## 2021-01-12 RX ADMIN — Medication 25 MILLIGRAM(S): at 17:50

## 2021-01-12 RX ADMIN — Medication 1 TABLET(S): at 11:27

## 2021-01-12 RX ADMIN — Medication 81 MILLIGRAM(S): at 11:27

## 2021-01-12 RX ADMIN — HALOPERIDOL DECANOATE 5 MILLIGRAM(S): 100 INJECTION INTRAMUSCULAR at 01:00

## 2021-01-12 RX ADMIN — Medication 25 MILLIGRAM(S): at 06:51

## 2021-01-12 RX ADMIN — ATORVASTATIN CALCIUM 80 MILLIGRAM(S): 80 TABLET, FILM COATED ORAL at 22:31

## 2021-01-12 RX ADMIN — OLANZAPINE 2.5 MILLIGRAM(S): 15 TABLET, FILM COATED ORAL at 22:31

## 2021-01-12 RX ADMIN — ATROPA BELLADONNA AND OPIUM 1 SUPPOSITORY(S): 16.2; 6 SUPPOSITORY RECTAL at 05:57

## 2021-01-12 RX ADMIN — ATROPA BELLADONNA AND OPIUM 1 SUPPOSITORY(S): 16.2; 6 SUPPOSITORY RECTAL at 22:31

## 2021-01-12 NOTE — PROGRESS NOTE ADULT - PROBLEM SELECTOR PLAN 8
F: None   E: Replete for K<4 Mag<2  N: DASH Diet  A: Holding in setting of anemia   Disposition: RMF    #Goals of care  - Patient with progressive dementia unable to participate in GOC discussion as per AM team.   - Reach out to patient's  (Divya Gutierrez [phone]) to determine if any medical directives.  - F/u palliative consult recs in AM

## 2021-01-12 NOTE — CONSULT NOTE ADULT - SUBJECTIVE AND OBJECTIVE BOX
Neurology consult    CURTIS [id number]    HPI:    89 y/o male w hx of HTN, arthritis, ?ETOH abuse, who presents after a witnessed fall in the street on 1/7, found to be encephalopathic and admitted to cardiology for medical treatment of NSTEMI. Hospital course complicated by urinary retention with hematuria after whitt insertion, now resolved. Patient’s encephalopathy initially attributed to alcohol withdrawal (unclear history). 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 Neurology consulted for etiology of toxic metabolic encephalopathy.       MEDICATIONS    aspirin enteric coated 81 milliGRAM(s) Oral daily  atorvastatin 80 milliGRAM(s) Oral at bedtime  belladonna 16.2 mG/opium 30 mg Suppository 1 Suppository(s) Rectal every 8 hours  finasteride 5 milliGRAM(s) Oral daily  folic acid 1 milliGRAM(s) Oral daily  metoprolol tartrate 25 milliGRAM(s) Oral every 12 hours  multivitamin 1 Tablet(s) Oral daily  oxybutynin 5 milliGRAM(s) Oral every 8 hours  tamsulosin 0.4 milliGRAM(s) Oral at bedtime  thiamine IVPB 500 milliGRAM(s) IV Intermittent every 24 hours         Family history: Patient unable to provide  FAMILY HISTORY:  No pertinent family history in first degree relatives      SOCIAL HISTORY -- unclear alcohol/substance abuse history     Allergies    No Known Allergies    Intolerances            Vital Signs Last 24 Hrs  T(C): 36.3 (12 Jan 2021 06:39), Max: 36.8 (11 Jan 2021 14:09)  T(F): 97.3 (12 Jan 2021 06:39), Max: 98.2 (11 Jan 2021 14:09)  HR: 66 (12 Jan 2021 06:39) (62 - 118)  BP: 146/72 (12 Jan 2021 06:50) (143/57 - 162/58)  BP(mean): 106 (11 Jan 2021 20:09) (95 - 106)  RR: 18 (12 Jan 2021 06:39) (18 - 22)  SpO2: 98% (12 Jan 2021 06:39) (96% - 100%)    Neurology:     - Mental Status: Unable to assess mental status as patient dysarthric and unable to use  service; speaks english intermittently  - Cranial NervesI:  PERRLA; moves eyes side to side, face symmetric    - Motor:  unable to assess strength, but moves limbs spontaneously  - Reflexes:  2+ and symmetric at the biceps, brachioradialis, knees;     - Sensory:  in tact to noxious stimuli   - Coordination:  unable to assess  - Gait:  deferred                  LABS:  CBC Full  -  ( 12 Jan 2021 05:46 )  WBC Count : 7.77 K/uL  RBC Count : 2.84 M/uL  Hemoglobin : 9.0 g/dL  Hematocrit : 28.6 %  Platelet Count - Automated : 137 K/uL  Mean Cell Volume : 100.7 fl  Mean Cell Hemoglobin : 31.7 pg  Mean Cell Hemoglobin Concentration : 31.5 gm/dL  Auto Neutrophil # : x  Auto Lymphocyte # : x  Auto Monocyte # : x  Auto Eosinophil # : x  Auto Basophil # : x  Auto Neutrophil % : x  Auto Lymphocyte % : x  Auto Monocyte % : x  Auto Eosinophil % : x  Auto Basophil % : x      01-12    145  |  112<H>  |  25<H>  ----------------------------<  131<H>  4.3   |  25  |  1.20    Ca    8.1<L>      12 Jan 2021 05:46  Phos  2.9     01-12  Mg     2.0     01-12    TPro  5.4<L>  /  Alb  2.9<L>  /  TBili  0.8  /  DBili  x   /  AST  25  /  ALT  15  /  AlkPhos  67  01-12    Hemoglobin A1C:     LIVER FUNCTIONS - ( 12 Jan 2021 05:46 )  Alb: 2.9 g/dL / Pro: 5.4 g/dL / ALK PHOS: 67 U/L / ALT: 15 U/L / AST: 25 U/L / GGT: x           Vitamin B12 Vitamin B12, Serum: 499 pg/mL (01-12 @ 03:15)          RADIOLOGY: Reviewed                     Neurology consult    CURTIS [id number]    HPI:    88 y/o male w hx of HTN, arthritis, ?ETOH abuse, who presents after a witnessed fall in the street on 1/7, found to be encephalopathic and admitted to cardiology for medical treatment of NSTEMI. Hospital course complicated by urinary retention with hematuria after whitt insertion, now resolved. Patient’s encephalopathy initially attributed to alcohol withdrawal (unclear history). 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 Neurology consulted for etiology of encephalopathy.       MEDICATIONS    aspirin enteric coated 81 milliGRAM(s) Oral daily  atorvastatin 80 milliGRAM(s) Oral at bedtime  belladonna 16.2 mG/opium 30 mg Suppository 1 Suppository(s) Rectal every 8 hours  finasteride 5 milliGRAM(s) Oral daily  folic acid 1 milliGRAM(s) Oral daily  metoprolol tartrate 25 milliGRAM(s) Oral every 12 hours  multivitamin 1 Tablet(s) Oral daily  oxybutynin 5 milliGRAM(s) Oral every 8 hours  tamsulosin 0.4 milliGRAM(s) Oral at bedtime  thiamine IVPB 500 milliGRAM(s) IV Intermittent every 24 hours         Family history: Patient unable to provide  FAMILY HISTORY:  No pertinent family history in first degree relatives      SOCIAL HISTORY -- unclear alcohol/substance abuse history     Allergies    No Known Allergies    Intolerances            Vital Signs Last 24 Hrs  T(C): 36.3 (12 Jan 2021 06:39), Max: 36.8 (11 Jan 2021 14:09)  T(F): 97.3 (12 Jan 2021 06:39), Max: 98.2 (11 Jan 2021 14:09)  HR: 66 (12 Jan 2021 06:39) (62 - 118)  BP: 146/72 (12 Jan 2021 06:50) (143/57 - 162/58)  BP(mean): 106 (11 Jan 2021 20:09) (95 - 106)  RR: 18 (12 Jan 2021 06:39) (18 - 22)  SpO2: 98% (12 Jan 2021 06:39) (96% - 100%)    Neurology:     - Mental Status: awake, attends to examiner somewhat but not completely alert, follows some simple commands; mixing english and Luxembourgish, even when mostly speaking in english he is not making much sense (when asked where he was, he said "i've been here in this place"... and trails off)  - Cranial Nerves:  PERRLA; moves eyes side to side, face symmetric    - Motor:  unable to assess strength, but moves limbs spontaneously  - Reflexes:  2+ and symmetric at the biceps, brachioradialis, knees;     - Sensory:  intact to noxious stimuli   - Coordination:  unable to assess  - Gait:  deferred                  LABS:  CBC Full  -  ( 12 Jan 2021 05:46 )  WBC Count : 7.77 K/uL  RBC Count : 2.84 M/uL  Hemoglobin : 9.0 g/dL  Hematocrit : 28.6 %  Platelet Count - Automated : 137 K/uL  Mean Cell Volume : 100.7 fl  Mean Cell Hemoglobin : 31.7 pg  Mean Cell Hemoglobin Concentration : 31.5 gm/dL  Auto Neutrophil # : x  Auto Lymphocyte # : x  Auto Monocyte # : x  Auto Eosinophil # : x  Auto Basophil # : x  Auto Neutrophil % : x  Auto Lymphocyte % : x  Auto Monocyte % : x  Auto Eosinophil % : x  Auto Basophil % : x      01-12    145  |  112<H>  |  25<H>  ----------------------------<  131<H>  4.3   |  25  |  1.20    Ca    8.1<L>      12 Jan 2021 05:46  Phos  2.9     01-12  Mg     2.0     01-12    TPro  5.4<L>  /  Alb  2.9<L>  /  TBili  0.8  /  DBili  x   /  AST  25  /  ALT  15  /  AlkPhos  67  01-12    Hemoglobin A1C:     LIVER FUNCTIONS - ( 12 Jan 2021 05:46 )  Alb: 2.9 g/dL / Pro: 5.4 g/dL / ALK PHOS: 67 U/L / ALT: 15 U/L / AST: 25 U/L / GGT: x           Vitamin B12 Vitamin B12, Serum: 499 pg/mL (01-12 @ 03:15)          RADIOLOGY: Reviewed

## 2021-01-12 NOTE — CONSULT NOTE ADULT - PROBLEM SELECTOR RECOMMENDATION 4
Per consult request patient has progressive dementia. Currently patient is probably a FAST 4, as he is Verbal and able to move his extremities. He does not qualify for hospice at this time.

## 2021-01-12 NOTE — CHART NOTE - NSCHARTNOTEFT_GEN_A_CORE
PALLIATIVE MEDICINE COORDINATION OF CARE NOTE FOR CURTIS MLADENOV  [  ] ED Trigger   [  ] MICU Trigger     [ X ] Consult    [  ] AI Comanagement    Never seen by palliative medicine    __30____ Minutes; Start: ___0900am__  End: __0930am__, of non-face-to-face prolonged service provided that relates to (face-to-face) care that has or will occur and ongoing patient management, including one or more of the following:   - Reviewed records from other physicians or other health care professional services, including one or more of the following: other medical records and diagnostic / radiology study results     HPI:  89 yr old Serbian/English speaking M, hx of ETOH abuse, POOR HISTORIAN, BIBEMS to [facility] 1/7/20 s/p witnessed fall by bystanders. Patient reportedly was walking in the street with his walker when he suddenly collapsed to the ground. Patient denied any prodromal dizziness, palpitations, chest pain, SOB, headache, visual changes or loss of sensation. Per EMS, no reported LOC and unknown if pt struck his head. Patient denies any current pain, only endorsing dry mouth and urge to urinate. Pt reportedly lives alone in an apartment and denies having ETOH today. Unable to obtain further information as patient repeatedly only states "I'm old, I don't know."  In [facility], BP: 193/109, HR: 73, RR:18, Temp: 98.5F, O2 sat: 98% RA, Fingerstick 154. EKG revealed NSR@97BPM with PVC noted, no acute ischemic changes. CXR unremarkable. CT head revealed no acute intracranial hemorrhage, mass effect or demarcated territorial infarction. Chronic microvessel ischemic changes. Labs notable for: Alcohol level <3, WBC 13.95 with slight left shift, Na 148, BUN/Cr 52/1.67, Lactate 2.2 with repeat level 1.2 (after IVF bolus), BNP 11,364, Initial Troponin I 0.241 and second set 0.395. UA: large blood, >10 RBC, bacteria present, negative nitrite/LE. Blood cultures drawn- pending. COVID PCR negative.  Patient treated with 2 liter IVF bolus, Azithromycin 500mg IV x 1 dose, Ceftriaxone 1g IV x 1 dose, Norvasc 5mg PO x 1 dose and ASA 325mg PO x 1 dose.  Patient was straight cathed for urinary retention with ~1L of urine retrieved.  Patient stable, now transferred to [facility] for cardiac telemetry, serial cardiac enzymes and further ischemic work-up.      (07 Jan 2021 23:41)    - Other: iStop reviewed.    Rx found on iStop review. Ref #:  [id number]    - Other: Medication reviewed.    The patient HAS NOT used PRN's in the last 24h.    MEDICATIONS  (STANDING):  aspirin enteric coated 81 milliGRAM(s) Oral daily  atorvastatin 80 milliGRAM(s) Oral at bedtime  belladonna 16.2 mG/opium 30 mg Suppository 1 Suppository(s) Rectal every 8 hours  finasteride 5 milliGRAM(s) Oral daily  folic acid 1 milliGRAM(s) Oral daily  metoprolol tartrate 25 milliGRAM(s) Oral every 12 hours  multivitamin 1 Tablet(s) Oral daily  oxybutynin 5 milliGRAM(s) Oral every 8 hours  tamsulosin 0.4 milliGRAM(s) Oral at bedtime  thiamine IVPB 500 milliGRAM(s) IV Intermittent every 24 hours    MEDICATIONS  (PRN):    - Other: Advanced directives     Full Code     No documented MOLST form found on [facility]     No documented HCP form found on [facility]     No Living will / POA / Advance directives found on [facility] / [facility].     No documented GOC notes on [facility]    - Other: Coordination/Plan of care     __1__ admissions in 1 year     Current admission LOS: 5___ days     LACE score: __8__ ADVANCE ILLNESS PATIENT.     Patient NOT previously seen by palliative medicine consult service.      Consult request for: " GOC, progressive dementia per niece   "    Full consult to follow within 24h.

## 2021-01-12 NOTE — CONSULT NOTE ADULT - PROBLEM SELECTOR RECOMMENDATION 2
Patient currently in restraints due to periods of agitation. Would recommend discontinuing of restraints and considering Zyprexa 2.5mg po at bedtime

## 2021-01-12 NOTE — PROGRESS NOTE ADULT - SUBJECTIVE AND OBJECTIVE BOX
OVERNIGHT EVENTS: dwayne    SUBJECTIVE / INTERVAL HPI: Patient seen and examined at bedside. he is asleep and arousable; however, not responding to questions appropriately, even when  used.     VITAL SIGNS:  Vital Signs Last 24 Hrs  T(C): 36.3 (12 Jan 2021 06:39), Max: 36.8 (11 Jan 2021 14:09)  T(F): 97.3 (12 Jan 2021 06:39), Max: 98.2 (11 Jan 2021 14:09)  HR: 66 (12 Jan 2021 06:39) (62 - 118)  BP: 146/72 (12 Jan 2021 06:50) (139/64 - 162/58)  BP(mean): 106 (11 Jan 2021 20:09) (92 - 106)  RR: 18 (12 Jan 2021 06:39) (18 - 22)  SpO2: 98% (12 Jan 2021 06:39) (96% - 100%)    PHYSICAL EXAM:    General: nad, asleep and arousable, however, not responding to questions appropriately  HEENT: NC/AT; mucus memb dry   Neck: supple  Cardiovascular: +S1/S2, RRR no murmurs appreciated   Respiratory: CTA B/L on 2L NC    Gastrointestinal: soft, NT/ND; +BSx4  Hip: Right hip ecchymosis, extending to right upper thigh and lower abd/groin site, slightly tender to palpation   Extremities: WWP; no edema b/l   Vascular: 2+ radial, PT pulses B/L  Neurological: awake alert, not answering orientation questions, moving all extremities spontaneously but not to command     MEDICATIONS:  MEDICATIONS  (STANDING):  aspirin enteric coated 81 milliGRAM(s) Oral daily  atorvastatin 80 milliGRAM(s) Oral at bedtime  belladonna 16.2 mG/opium 30 mg Suppository 1 Suppository(s) Rectal every 8 hours  finasteride 5 milliGRAM(s) Oral daily  folic acid 1 milliGRAM(s) Oral daily  metoprolol tartrate 25 milliGRAM(s) Oral every 12 hours  multivitamin 1 Tablet(s) Oral daily  oxybutynin 5 milliGRAM(s) Oral every 8 hours  tamsulosin 0.4 milliGRAM(s) Oral at bedtime  thiamine IVPB 500 milliGRAM(s) IV Intermittent every 24 hours    MEDICATIONS  (PRN):      ALLERGIES:  Allergies    No Known Allergies    Intolerances        LABS:                        9.0    7.77  )-----------( 137      ( 12 Jan 2021 05:46 )             28.6     01-12    145  |  112<H>  |  25<H>  ----------------------------<  131<H>  4.3   |  25  |  1.20    Ca    8.1<L>      12 Jan 2021 05:46  Phos  2.9     01-12  Mg     2.0     01-12    TPro  5.4<L>  /  Alb  2.9<L>  /  TBili  0.8  /  DBili  x   /  AST  25  /  ALT  15  /  AlkPhos  67  01-12        CAPILLARY BLOOD GLUCOSE          RADIOLOGY & ADDITIONAL TESTS: Reviewed.    ASSESSMENT:    PLAN:

## 2021-01-12 NOTE — CONSULT NOTE ADULT - REASON FOR ADMISSION
s/p witnessed fall/elevated cardiac enzymes

## 2021-01-12 NOTE — PROGRESS NOTE ADULT - PROBLEM SELECTOR PLAN 2
TME initially attributed to alcohol withdrawal, CTH neg, but s/p more collateral today, niece endorsed prior workup for progressive dementia (pending A establishment) and denied any prior substance/alcohol abuse. DDX likely progressive dementia and or/concurrent delirium vs less likely metabolic.   -  ammonia level WNL   - TSH, B12, Folate wnl  - F/u neurology recs

## 2021-01-12 NOTE — CONSULT NOTE ADULT - PROBLEM SELECTOR RECOMMENDATION 3
Patient presented s/p fall in the street. Patient has no hip fracture per his CT and no bleed in per his CT of his head. Appreciate PT involvement.

## 2021-01-12 NOTE — PROGRESS NOTE ADULT - PROBLEM SELECTOR PLAN 1
Normocytic anemia with iron labs consistent with AOCD (drop in Hb from 14--> 11s range 1/9--> 9.8 1/10--> 7.8-8.1 1/11 progressive decrease, no reported melena or hematochezia, but had hematuria which has since resolved), on exam with R-hip hematoma.   - Vascular consulted for right hip hematoma  - f/u 2pm cbc  - C/w active T&S q72h last 1/11   - C/w 2 large bore IVs

## 2021-01-12 NOTE — PROGRESS NOTE ADULT - PROBLEM SELECTOR PLAN 3
Initial EKG @[facility] w/out acute changes, repeat EKG@[facility] SR @85BPM w/ PACs and biphasic TW V2-V3 c/w Wellens sign. S/p medical management for NSTEMI given non-consentable for cardiac cath in light of AMS on admission. Trops peaked. S/p IV Heparin x 48hrs for NSTEMI.  -c/w ASA 81mg qd and Atorvastatin 80mg qd  -D/c Plavix 75mg qd given anemia   - on metoprolol tartrate 25mg BID  - ECHO 1/8: Borderline normal LVSF- EF 50-55%, Grade 1LV diastolic dysfunction, mild TR.  - F/u cardiology recs

## 2021-01-12 NOTE — CONSULT NOTE ADULT - SUBJECTIVE AND OBJECTIVE BOX
CURTIS MCCOY          MRN-[id number]            ([id number])    HPI:  89 yr old Welsh/English speaking M, hx of ETOH abuse, POOR HISTORIAN, BIBEMS to [facility] 20 s/p witnessed fall by bystanders. Patient reportedly was walking in the street with his walker when he suddenly collapsed to the ground. Patient denied any prodromal dizziness, palpitations, chest pain, SOB, headache, visual changes or loss of sensation. Per EMS, no reported LOC and unknown if pt struck his head. Patient denies any current pain, only endorsing dry mouth and urge to urinate. Pt reportedly lives alone in an apartment and denies having ETOH today. Unable to obtain further information as patient repeatedly only states "I'm old, I don't know."  In [facility], BP: 193/109, HR: 73, RR:18, Temp: 98.5F, O2 sat: 98% RA, Fingerstick 154. EKG revealed NSR@97BPM with PVC noted, no acute ischemic changes. CXR unremarkable. CT head revealed no acute intracranial hemorrhage, mass effect or demarcated territorial infarction. Chronic microvessel ischemic changes. Labs notable for: Alcohol level <3, WBC 13.95 with slight left shift, Na 148, BUN/Cr 52/1.67, Lactate 2.2 with repeat level 1.2 (after IVF bolus), BNP 11,364, Initial Troponin I 0.241 and second set 0.395. UA: large blood, >10 RBC, bacteria present, negative nitrite/LE. Blood cultures drawn- pending. COVID PCR negative.  Patient treated with 2 liter IVF bolus, Azithromycin 500mg IV x 1 dose, Ceftriaxone 1g IV x 1 dose, Norvasc 5mg PO x 1 dose and ASA 325mg PO x 1 dose.  Patient was straight cathed for urinary retention with ~1L of urine retrieved.  Patient stable, now transferred to [facility] for cardiac telemetry, serial cardiac enzymes and further ischemic work-up.      (2021 23:41)    PAST MEDICAL & SURGICAL HISTORY:  No pertinent past medical history    No significant past surgical history        FAMILY HISTORY:  No pertinent family history in first degree relatives     Reviewed and found non contributory in mother or father    SOCIAL HISTORY:    Tobacco/illicit drugs: denies  ETOH: denies ever drinking upon [facility], [facility] with [facility]x of [facility]H abuse and had beer earlier today.    ROS:    Unable to attain due to:  n/a                    Dyspnea (Lilly 0-10):      0                  N/V (Y/N):                   N           Secretions (Y/N) :        N        Agitation(Y/N): N  Pain (Y/N):     N  -Provocation/Palliation: n/a   -Quality/Quantity: n/a   -Radiating: n/a   -Severity: n/a   -Timing/Frequency:n/a   -Impact on ADLs: n/a     General:  Denied  HEENT:    Denied  Neck:  Denied  CVS:  Denied  Resp:  Denied  GI:  Denied  :  Denied  Musc:  Denied  Neuro:  Denied  Psych:  Denied  Skin:  Denied  Lymph:  Denied    Allergies    No Known Allergies    Intolerances      Opiate Naive (Y/N): Y  -iStop reviewed (Y/N): (Ref#:    [id number]       )    Medications:      MEDICATIONS  (STANDING):  aspirin enteric coated 81 milliGRAM(s) Oral daily  atorvastatin 80 milliGRAM(s) Oral at bedtime  belladonna 16.2 mG/opium 30 mg Suppository 1 Suppository(s) Rectal every 8 hours  finasteride 5 milliGRAM(s) Oral daily  folic acid 1 milliGRAM(s) Oral daily  metoprolol tartrate 25 milliGRAM(s) Oral every 12 hours  multivitamin 1 Tablet(s) Oral daily  oxybutynin 5 milliGRAM(s) Oral every 8 hours  tamsulosin 0.4 milliGRAM(s) Oral at bedtime  thiamine IVPB 500 milliGRAM(s) IV Intermittent every 24 hours    MEDICATIONS  (PRN):      Labs:    CBC:                        9.0    7.77  )-----------( 137      ( 2021 05:46 )             28.6     CMP:        145  |  112<H>  |  25<H>  ----------------------------<  131<H>  4.3   |  25  |  1.20    Ca    8.1<L>      2021 05:46  Phos  2.9       Mg     2.0         TPro  5.4<L>  /  Alb  2.9<L>  /  TBili  0.8  /  DBili  x   /  AST  25  /  ALT  15  /  AlkPhos  67      Imaging:    < from: CT Hip No Cont, Right (21 @ 20:57) >  EXAM:  CT HIP ONLY RT                          PROCEDURE DATE:  2021  IMPRESSION:    Subcutaneous hematoma of the lateral right thigh without underlying fracture.    < end of copied text >    < from: CT Head No Cont (21 @ 17:42) >    EXAM:  CT BRAIN                           PROCEDURE DATE:  2021  Impression:    No acute intracranial hemorrhage, mass effect or demarcated territorial infarction. Chronic microvessel ischemic changes.    Paranasal sinus disease.    < end of copied text >    < from: Xray Chest 1 View AP/PA (21 @ 17:41) >  EXAM:  XR CHEST AP OR PA 1V                           PROCEDURE DATE:  2021    FINDINGS/  impression: Cardiomegaly, thoracic aortic calcification. Lungs and mediastinum are unremarkable. Thoracic spine degenerative changes, dextroscoliosis.: Elevation of the left hemidiaphragm    < end of copied text >      PEx:  T(C): 36.3 (21 @ 06:39), Max: 36.8 (21 @ 14:09)  HR: 66 (21 @ 06:39) (62 - 118)  BP: 146/72 (21 @ 06:50) (143/57 - 162/58)  RR: 18 (21 @ 06:39) (18 - 22)  SpO2: 98% (21 @ 06:39) (96% - 100%)  Wt(kg): 70.3kg  Daily     Daily   CAPILLARY BLOOD GLUCOSE    I&O's Summary    2021 07:01  -  2021 07:00  --------------------------------------------------------  IN: 950 mL / OUT: 1200 mL / NET: -250 mL    GENERAL:  [ x]Alert  [ x]Oriented x 1   [ ]Lethargic  [ ]Cachexia  [ ]Unarousable  [x ]Verbal  [ ]Non-Verbal  Behavioral:   [ ] Anxiety  [ ] Delirium [ ] Agitation [ x] Other - calm  HEENT:  [ ]Normal   [ x]Dry mouth   [ ]ET Tube/Trach  [ ]Oral lesions  PULMONARY:   [x ]Clear  [ ]Tachypnea  [ ]Audible excessive secretions   [ ]Rhonchi        [ ]Right [ ]Left [ ]Bilateral  [ ]Crackles        [ ]Right [ ]Left [ ]Bilateral  [ ]Wheezing     [ ]Right [ ]Left [ ]Bilateral  CARDIOVASCULAR:    [x ]Regular [ ]Irregular [ ]Tachy  [ ]Greg [ ]Murmur [ ]Other  GASTROINTESTINAL:  [x ]Soft  [ ]Distended   [ ]+BS  [x ]Non tender [ ]Tender  [ ]PEG [ ]OGT/ NGT  Last BM:   GENITOURINARY:  [ ]Normal [ ] Incontinent   [ ]Oliguria/Anuria   [x ]Batres  MUSCULOSKELETAL:   [ ]Normal   [x ]Weakness  [ ]Bed/Wheelchair bound [ ]Edema  NEUROLOGIC:   [ ]No focal deficits  [x ] Cognitive impairment  [ ] Dysphagia [ ]Dysarthria [ ] Paresis [ ]Other   SKIN:   [x ]Normal   [ ]Pressure ulcer(s)  [ ]Rash      Preadmit Karnofsky:  80 %           Current Karnofsky:   60  %  Cachexia (Y/N): N  BMI: 22.9kG    Advanced Directives:     Full Code     DECISION MAKER: Patient is unable to make decisions for himself  LEGAL SURROGATE: Niece: Jp [phone]    GOALS OF CARE DISCUSSION       Palliative care info/counseling provided	            Documentation of GOC: 	Full code          REFERRALS	        Unit SW/Case Mgmt       PT/OT

## 2021-01-12 NOTE — CONSULT NOTE ADULT - SUBJECTIVE AND OBJECTIVE BOX
Attending: Yogesh    HPI:   89YOM Poor historian with PMH of HTN, arthritis who was BIBEMS to [facility] on 1/7 s/p witness fall (collapsed with walker) with no subsequent LOC, but unclear head trauma. Upon presentation only reporting dry mouth and urinary urgency on ROS but encephalopathic/poor historian. He was admitted to cardiac tele for NSTEMI (wellens sign on EKG/tropinemia) c/b HTN urgency with decision for medical treatment as not consentable (s/p 48h heparin gtt and transitioned to ASA/Plavix and Lipito 80qd). Course c/b urinary retention s/p whitt insertion by Urology with subsequent hematuria that has since resolved s/p CBI (whitt currently in place). 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 Course also c/b normocytic anemia with iron labs consistent with AOCD (drop in Hb from 14--> 11s range 1/9--> 9.8 1/10--> 7.8-8.1 1/11 progressive decrease, no reported melena or hematochezia, but had hematuria which has since resolved), on exam with R-hip ecchymosis concern for possible localized hematoma 2/2 fall for which R-CT-hip ordered upon stepdown to [facility].     History as above, vascular consulted for evaluation of subcutaneous hematoma noted on CT noncon of R hip. CT report showing hematoma of subcutaneous fat lateral to R hip 8.58 x 5 x 8.45 cm extending into the lateral upper R thigh, which is within subcutaneous fat only and does not involve deeper muscular layers. History is limited by patient's mental status, he is alert and oriented to self and place, denies pain, however his answers to most other questions are nonsensical (attempted to speak via Botswanan and Vietnamese  as well as English). Patient is unaware of the witnessed fall that occurred. He was on plavix until today and remains beta blocked and on asa. Hgb uptrending 7.8 to 9.0 in last 24 hours without transfusion, hematuria resolved. WBC wnl not on abx, afebrile since arrival.    PAST MEDICAL & SURGICAL HISTORY:  As above    MEDICATIONS  (STANDING):  aspirin enteric coated 81 milliGRAM(s) Oral daily  atorvastatin 80 milliGRAM(s) Oral at bedtime  belladonna 16.2 mG/opium 30 mg Suppository 1 Suppository(s) Rectal every 8 hours  finasteride 5 milliGRAM(s) Oral daily  folic acid 1 milliGRAM(s) Oral daily  metoprolol tartrate 25 milliGRAM(s) Oral every 12 hours  multivitamin 1 Tablet(s) Oral daily  OLANZapine 2.5 milliGRAM(s) Oral every 24 hours  oxybutynin 5 milliGRAM(s) Oral every 8 hours  tamsulosin 0.4 milliGRAM(s) Oral at bedtime  thiamine IVPB 500 milliGRAM(s) IV Intermittent every 24 hours    MEDICATIONS  (PRN):    Allergies    No Known Allergies    Intolerances      FAMILY HISTORY:  No pertinent family history in first degree relatives        T(C): 36.3 (01-12-21 @ 06:39), Max: 36.8 (01-11-21 @ 14:09)  HR: 66 (01-12-21 @ 06:39) (66 - 118)  BP: 146/72 (01-12-21 @ 06:50) (143/57 - 162/58)  RR: 18 (01-12-21 @ 06:39) (18 - 20)  SpO2: 98% (01-12-21 @ 06:39) (96% - 100%)    GENERAL: NAD, Resting comfortably in bed, awake, opens eyes spontaneously  HEENT: NCAT, MMM, Normal conjunctiva, PERRL  RESP: Nonlabored breathing, No respiratory distress, on NC  CARD: Normal rate, Normal peripheral perfusion  GI: Soft, ND, NT, No guarding, No rebound tenderness  EXTREM: WWP, 2+ fem/pop/DP/PT bilaterally, large purple ecchymosis extending from R thigh to R flank which is non-tender and soft  SKIN: No rashes, no lesions  NEURO: AAOx2, No focal motor or sensory deficits  PSYCH: Affect and characteristics of appearance, verbalizations, and behaviors are appropriate    LABS:                        9.0    7.77  )-----------( 137      ( 12 Jan 2021 05:46 )             28.6     01-12    145  |  112<H>  |  25<H>  ----------------------------<  131<H>  4.3   |  25  |  1.20    Ca    8.1<L>      12 Jan 2021 05:46  Phos  2.9     01-12  Mg     2.0     01-12    TPro  5.4<L>  /  Alb  2.9<L>  /  TBili  0.8  /  DBili  x   /  AST  25  /  ALT  15  /  AlkPhos  67  01-12      LIVER FUNCTIONS - ( 12 Jan 2021 05:46 )  Alb: 2.9 g/dL / Pro: 5.4 g/dL / ALK PHOS: 67 U/L / ALT: 15 U/L / AST: 25 U/L / GGT: x             RADIOLOGY & ADDITIONAL STUDIES:  FINDINGS: CT of the bony pelvis was performed in the axial plane. Reconstructions of the right hip were performed in the sagittal planes. Reference is made to prior radiograph dated 5/9/2011.    Evaluation of the bony pelvis demonstrates very severe degenerative change of the lower lumbar spine. There is mild degenerative change of the bilateral sacroiliac joints. Mild degenerative change of the pubic symphysis. There is mild narrowing of the bilateral hip joints. Enthesopathic changes adjacent to the greater trochanters. Small os acetabuli. There is edematous and hemorrhagic infiltration within the subcutaneous tissues adjacent to the right greater trochanter spanning about 9.6 x 4.7 cm with significant surrounding edematous infiltration of the surrounding subcutaneous tissues; no evidence of intramuscular hematoma. No underlying fracture.    Evaluation of the internal pelvic organs demonstrate Whitt catheter within the bladder. Calcified material within the bladder. Large volume of stool within distended rectum. Enlargement of the prostate gland. Severe vascular calcification. 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 Fat-containing periumbilical hernia. Colonic diverticulosis. Small fat-containing bilateral inguinal hernias. Severe vascular calcification.    IMPRESSION:    Subcutaneous hematoma of the lateral right thigh without underlying fracture.      Assessment:    89M (poor historian), PMHx HTN, arthritis, presented on 1/7 s/p witnessed fall, admitted for NSTEMI s/p 48 hr heparin gtt then transitioned to ASA/plavix,, course c/b hematuria which has resolved s/p CBI, initially with downtrending Hgb 11s->8.1 now improving without transfusion (Fe labs c/w AOCD), who was noted to have a subcutaneous hematoma of R hip demonstrated on CT. This hematoma is soft, likely stable given uptrending H/H, without clinical or laboratory signs of infection. Compression of peripheral nerves is unlikely given nonfocal exam.     Recommendations:  - No vascular surgical intervention at this time  - Trend CBC and transfuse as needed  - Monitor for signs of infection, neurological deficits, extension of ecchymosis  - Timeline to resume AC per primary  - Discussed with chief on call, attending surgeon

## 2021-01-12 NOTE — CONSULT NOTE ADULT - PROBLEM SELECTOR RECOMMENDATION 5
Patient currently full code.   As discussed during the palliative IDT meeting, the patients PSSA screening did not identify any current psychosocial need or spiritual support deficits. Patient currently full code. Discussion had with patient parvizece. Geriatric and palliative care services were introduced to the niece, she stated that she lives in [place] and he has Guardianship through [facility]. WIll continue to follow for ongoing goals of care and support.   As discussed during the palliative IDT meeting, the patients PSSA screening did not identify any current psychosocial need or spiritual support deficits.

## 2021-01-12 NOTE — PROGRESS NOTE ADULT - PROBLEM SELECTOR PLAN 7
Fall upon presentation unclear if mechanical as denied prodromal symptoms as per H&P, CT head neg for acute process.   - F/u workup for TME, likely 2/2 progressive dementia   - neuro recs

## 2021-01-12 NOTE — CONSULT NOTE ADULT - ATTENDING COMMENTS
Case reviewed with Chief Resident.    Agree with the above findings and statements as documented.    Plan as above.
Delirium, likely multifactorial, superimposed on baseline cognitive impairment from dementia  Baseline is unclear  B12, folate, TSH normal and ammonia negative  CT head with chronic left thalamic infarct, chronic microvascular disease, no acute findings  Agree with zyprexa and d/c hand restraints  Frequent reorientation, speaking with his family on the phone may help  Obtain collateral re: baseline mental status  No further neurologic testing at this time   Will follow
Pt w/ new anemia, suspect bleed, HgB has stabilized but pending CT of R hip to r/o active bleed/fracture, if further drop in HgB will need ICU consult

## 2021-01-12 NOTE — CONSULT NOTE ADULT - ASSESSMENT
88 y/o male w hx of HTN, arthritis, ?ETOH abuse, who presents after a witnessed fall in the street on 1/7, admitted to cardiac tele for NSTEMI; now on RMF. Neurology consulted for toxic metabolic encephalopathy.     #Toxic metabolic encephalopathy. Patient experienced fall at home; CT head on admission without acute pathology. As per niece, patient has had a work-up outpatient for dementia. There was concern for alcohol abuse, but on exam, patient without tremor or tongue fasiculations. Exam limited as patient unable to speak English, and  service unable to understand patient.   - will need collateral from niece regarding most recent mental status and alcohol/substance abuse     Neurology will continue to follow   88 y/o male w hx of HTN, arthritis, ?ETOH abuse, who presents after a witnessed fall in the street on 1/7, admitted to cardiac tele for NSTEMI; now on RMF. Neurology consulted for altered mental status    #Toxic metabolic encephalopathy. Patient experienced fall at home; CT head on admission without acute pathology. As per niece, patient has had a work-up outpatient for dementia. There was concern for alcohol abuse, but on exam, patient without tremor or tongue fasciculations.  Exam limited as patient unable to speak English, and  service unable to understand patient.   - he appears to have delirium in the setting of dementia   - B12, folate, TSH normal; ammonia negative  - will need collateral from niece regarding baseline mental status and alcohol/substance abuse     Neurology will continue to follow

## 2021-01-12 NOTE — CONSULT NOTE ADULT - PROBLEM SELECTOR RECOMMENDATION 9
-please continue CBI o/n  -will reassess in the AM
PPS 60%. Skin care PRN. Assist with ADL's PRN. Appreciate PT involvement who recommend [facility] vs Home PT.

## 2021-01-12 NOTE — PROGRESS NOTE ADULT - PROBLEM SELECTOR PLAN 5
Upon admission BUN/Creat 52/1.67. Baseline creatinine unknown. UA large blood, >10 RBC, bacteria present, negative nitrite/LE, FENA 1.6% c/w intrinsic renal disease.   - BUN/Cr Improving.   - C/w BMP qd  - avoid nephrotoxic agents and renally dose meds.

## 2021-01-12 NOTE — PROGRESS NOTE ADULT - ASSESSMENT
89YOM Poor historian with PMH of HTN, arthritis who was BIBEMS to [facility] on 1/7 s/p witness fall (collapsed with walker) with no subsequent LOC, but unclear head trauma,CTH neg, admitted to cardiac tele for NSTEMI (wellens sign on EKG/tropinemia) c/b HTN urgency with decision for medical treatment as not consentable (s/p 48h heparin gtt and transitioned to ASA/Plavix and Lipito 80qd). Course c/b urinary retention s/p whitt insertion by Urology with subsequent hematuria that has since resolved s/p CBI (whitt currently in place). Course also c/b normocytic anemia with iron labs consistent with AOCD (drop in Hb from 14--> 11s range 1/9--> 9.8 1/10--> 7.8-8.1 1/11 progressive decrease, no reported melena or hematochezia, but had hematuria which has since resolved), on exam with R-hip ecchymosis concern for possible localized hematoma 2/2 fall for which R-CT-hip ordered upon stepdown to [facility].

## 2021-01-13 ENCOUNTER — TRANSCRIPTION ENCOUNTER (OUTPATIENT)
Age: 86
End: 2021-01-13

## 2021-01-13 DIAGNOSIS — Z71.89 OTHER SPECIFIED COUNSELING: ICD-10-CM

## 2021-01-13 LAB
ANION GAP SERPL CALC-SCNC: 10 MMOL/L — SIGNIFICANT CHANGE UP (ref 5–17)
BUN SERPL-MCNC: 28 MG/DL — HIGH (ref 7–23)
CALCIUM SERPL-MCNC: 8.1 MG/DL — LOW (ref 8.4–10.5)
CHLORIDE SERPL-SCNC: 110 MMOL/L — HIGH (ref 96–108)
CO2 SERPL-SCNC: 25 MMOL/L — SIGNIFICANT CHANGE UP (ref 22–31)
CREAT SERPL-MCNC: 1.28 MG/DL — SIGNIFICANT CHANGE UP (ref 0.5–1.3)
CULTURE RESULTS: SIGNIFICANT CHANGE UP
CULTURE RESULTS: SIGNIFICANT CHANGE UP
GLUCOSE SERPL-MCNC: 124 MG/DL — HIGH (ref 70–99)
HCT VFR BLD CALC: 29.4 % — LOW (ref 39–50)
HGB BLD-MCNC: 9 G/DL — LOW (ref 13–17)
MAGNESIUM SERPL-MCNC: 1.8 MG/DL — SIGNIFICANT CHANGE UP (ref 1.6–2.6)
MCHC RBC-ENTMCNC: 30.6 GM/DL — LOW (ref 32–36)
MCHC RBC-ENTMCNC: 30.8 PG — SIGNIFICANT CHANGE UP (ref 27–34)
MCV RBC AUTO: 100.7 FL — HIGH (ref 80–100)
NRBC # BLD: 0 /100 WBCS — SIGNIFICANT CHANGE UP (ref 0–0)
PLATELET # BLD AUTO: 167 K/UL — SIGNIFICANT CHANGE UP (ref 150–400)
POTASSIUM SERPL-MCNC: 4.4 MMOL/L — SIGNIFICANT CHANGE UP (ref 3.5–5.3)
POTASSIUM SERPL-SCNC: 4.4 MMOL/L — SIGNIFICANT CHANGE UP (ref 3.5–5.3)
RBC # BLD: 2.92 M/UL — LOW (ref 4.2–5.8)
RBC # FLD: 13.8 % — SIGNIFICANT CHANGE UP (ref 10.3–14.5)
SARS-COV-2 RNA SPEC QL NAA+PROBE: NEGATIVE — SIGNIFICANT CHANGE UP
SODIUM SERPL-SCNC: 145 MMOL/L — SIGNIFICANT CHANGE UP (ref 135–145)
SPECIMEN SOURCE: SIGNIFICANT CHANGE UP
SPECIMEN SOURCE: SIGNIFICANT CHANGE UP
WBC # BLD: 10.32 K/UL — SIGNIFICANT CHANGE UP (ref 3.8–10.5)
WBC # FLD AUTO: 10.32 K/UL — SIGNIFICANT CHANGE UP (ref 3.8–10.5)

## 2021-01-13 PROCEDURE — 99232 SBSQ HOSP IP/OBS MODERATE 35: CPT

## 2021-01-13 PROCEDURE — 99358 PROLONG SERVICE W/O CONTACT: CPT

## 2021-01-13 PROCEDURE — 99233 SBSQ HOSP IP/OBS HIGH 50: CPT | Mod: GC

## 2021-01-13 PROCEDURE — 99233 SBSQ HOSP IP/OBS HIGH 50: CPT

## 2021-01-13 RX ORDER — CLOPIDOGREL BISULFATE 75 MG/1
1 TABLET, FILM COATED ORAL
Qty: 0 | Refills: 0 | DISCHARGE
Start: 2021-01-13

## 2021-01-13 RX ORDER — OLANZAPINE 15 MG/1
2.5 TABLET, FILM COATED ORAL ONCE
Refills: 0 | Status: COMPLETED | OUTPATIENT
Start: 2021-01-13 | End: 2021-01-13

## 2021-01-13 RX ORDER — TAMSULOSIN HYDROCHLORIDE 0.4 MG/1
1 CAPSULE ORAL
Qty: 0 | Refills: 0 | DISCHARGE
Start: 2021-01-13

## 2021-01-13 RX ORDER — METOPROLOL TARTRATE 50 MG
1 TABLET ORAL
Qty: 0 | Refills: 0 | DISCHARGE
Start: 2021-01-13

## 2021-01-13 RX ORDER — CLOPIDOGREL BISULFATE 75 MG/1
75 TABLET, FILM COATED ORAL DAILY
Refills: 0 | Status: DISCONTINUED | OUTPATIENT
Start: 2021-01-13 | End: 2021-01-13

## 2021-01-13 RX ORDER — ATORVASTATIN CALCIUM 80 MG/1
1 TABLET, FILM COATED ORAL
Qty: 0 | Refills: 0 | DISCHARGE
Start: 2021-01-13

## 2021-01-13 RX ORDER — OLANZAPINE 15 MG/1
1 TABLET, FILM COATED ORAL
Qty: 0 | Refills: 0 | DISCHARGE
Start: 2021-01-13

## 2021-01-13 RX ORDER — OXYBUTYNIN CHLORIDE 5 MG
1 TABLET ORAL
Qty: 0 | Refills: 0 | DISCHARGE
Start: 2021-01-13

## 2021-01-13 RX ORDER — MAGNESIUM SULFATE 500 MG/ML
2 VIAL (ML) INJECTION ONCE
Refills: 0 | Status: COMPLETED | OUTPATIENT
Start: 2021-01-13 | End: 2021-01-13

## 2021-01-13 RX ORDER — ASPIRIN/CALCIUM CARB/MAGNESIUM 324 MG
1 TABLET ORAL
Qty: 0 | Refills: 0 | DISCHARGE
Start: 2021-01-13

## 2021-01-13 RX ORDER — FINASTERIDE 5 MG/1
1 TABLET, FILM COATED ORAL
Qty: 0 | Refills: 0 | DISCHARGE
Start: 2021-01-13

## 2021-01-13 RX ORDER — ATROPA BELLADONNA AND OPIUM 16.2; 6 MG/1; MG/1
1 SUPPOSITORY RECTAL
Qty: 0 | Refills: 0 | DISCHARGE
Start: 2021-01-13

## 2021-01-13 RX ADMIN — Medication 5 MILLIGRAM(S): at 21:58

## 2021-01-13 RX ADMIN — ATROPA BELLADONNA AND OPIUM 1 SUPPOSITORY(S): 16.2; 6 SUPPOSITORY RECTAL at 13:16

## 2021-01-13 RX ADMIN — Medication 5 MILLIGRAM(S): at 05:35

## 2021-01-13 RX ADMIN — Medication 5 MILLIGRAM(S): at 13:16

## 2021-01-13 RX ADMIN — ATROPA BELLADONNA AND OPIUM 1 SUPPOSITORY(S): 16.2; 6 SUPPOSITORY RECTAL at 05:34

## 2021-01-13 RX ADMIN — Medication 25 MILLIGRAM(S): at 17:50

## 2021-01-13 RX ADMIN — OLANZAPINE 2.5 MILLIGRAM(S): 15 TABLET, FILM COATED ORAL at 21:58

## 2021-01-13 RX ADMIN — Medication 50 GRAM(S): at 13:18

## 2021-01-13 RX ADMIN — TAMSULOSIN HYDROCHLORIDE 0.4 MILLIGRAM(S): 0.4 CAPSULE ORAL at 21:58

## 2021-01-13 RX ADMIN — Medication 25 MILLIGRAM(S): at 05:35

## 2021-01-13 RX ADMIN — ATROPA BELLADONNA AND OPIUM 1 SUPPOSITORY(S): 16.2; 6 SUPPOSITORY RECTAL at 21:58

## 2021-01-13 RX ADMIN — FINASTERIDE 5 MILLIGRAM(S): 5 TABLET, FILM COATED ORAL at 10:17

## 2021-01-13 RX ADMIN — ATORVASTATIN CALCIUM 80 MILLIGRAM(S): 80 TABLET, FILM COATED ORAL at 21:58

## 2021-01-13 RX ADMIN — CLOPIDOGREL BISULFATE 75 MILLIGRAM(S): 75 TABLET, FILM COATED ORAL at 10:16

## 2021-01-13 RX ADMIN — OLANZAPINE 2.5 MILLIGRAM(S): 15 TABLET, FILM COATED ORAL at 17:50

## 2021-01-13 RX ADMIN — Medication 81 MILLIGRAM(S): at 10:16

## 2021-01-13 NOTE — PROGRESS NOTE ADULT - PROBLEM SELECTOR PLAN 1
Normocytic anemia with iron labs consistent with AOCD (drop in Hb from 14--> 11s range 1/9--> 9.8 1/10--> 7.8-8.1 1/11 progressive decrease, no reported melena or hematochezia, but had hematuria which has since resolved), on exam with R-hip hematoma.   - Vascular consulted for right hip hematoma  - C/w active T&S q72h last 1/11   - C/w 2 large bore IVs  - hg stable, will restart plavix

## 2021-01-13 NOTE — PROGRESS NOTE ADULT - PROBLEM SELECTOR PROBLEM 8
Nutrition, metabolism, and development symptoms
Alcohol abuse

## 2021-01-13 NOTE — PROGRESS NOTE ADULT - SUBJECTIVE AND OBJECTIVE BOX
INTERVAL HPI/OVERNIGHT EVENTS:    OVERNIGHT: No overnight events.  SUBJECTIVE: Patient seen and examined at bedside. Appears more alert today. Unable to answer ROS questions.     OBJECTIVE:    VITAL SIGNS:  ICU Vital Signs Last 24 Hrs  T(C): 36.3 (13 Jan 2021 05:50), Max: 36.9 (12 Jan 2021 14:47)  T(F): 97.3 (13 Jan 2021 05:50), Max: 98.4 (12 Jan 2021 14:47)  HR: 74 (13 Jan 2021 05:50) (66 - 76)  BP: 171/68 (13 Jan 2021 05:50) (136/73 - 171/68)  BP(mean): --  ABP: --  ABP(mean): --  RR: 19 (13 Jan 2021 05:50) (17 - 19)  SpO2: 98% (13 Jan 2021 05:50) (98% - 99%)        01-12 @ 07:01 - 01-13 @ 07:00  --------------------------------------------------------  IN: 0 mL / OUT: 1350 mL / NET: -1350 mL    01-13 @ 07:01 - 01-13 @ 12:10  --------------------------------------------------------  IN: 420 mL / OUT: 0 mL / NET: 420 mL      CAPILLARY BLOOD GLUCOSE          PHYSICAL EXAM:  Neuro  - Mental Status: awake and states that he is 89; does not answer more questions  - Cranial Nerves:  PERRLA; moves eyes side to side, face symmetric    - Motor:  unable to assess strength, but moves limbs spontaneously  - Reflexes:  2+ and symmetric at the biceps, brachioradialis, knees;     - Sensory: withdraws all extremities to noxious stimuli    - Coordination:  unable to assess  - Gait:  deferred    MEDICATIONS:  MEDICATIONS  (STANDING):  aspirin enteric coated 81 milliGRAM(s) Oral daily  atorvastatin 80 milliGRAM(s) Oral at bedtime  belladonna 16.2 mG/opium 30 mg Suppository 1 Suppository(s) Rectal every 8 hours  clopidogrel Tablet 75 milliGRAM(s) Oral daily  finasteride 5 milliGRAM(s) Oral daily  magnesium sulfate  IVPB 2 Gram(s) IV Intermittent once  metoprolol tartrate 25 milliGRAM(s) Oral every 12 hours  OLANZapine 2.5 milliGRAM(s) Oral every 24 hours  oxybutynin 5 milliGRAM(s) Oral every 8 hours  tamsulosin 0.4 milliGRAM(s) Oral at bedtime    MEDICATIONS  (PRN):      ALLERGIES:  Allergies    No Known Allergies    Intolerances        LABS:                        9.0    10.32 )-----------( 167      ( 13 Jan 2021 07:04 )             29.4     01-13    145  |  110<H>  |  28<H>  ----------------------------<  124<H>  4.4   |  25  |  1.28    Ca    8.1<L>      13 Jan 2021 07:04  Phos  2.9     01-12  Mg     1.8     01-13    TPro  5.4<L>  /  Alb  2.9<L>  /  TBili  0.8  /  DBili  x   /  AST  25  /  ALT  15  /  AlkPhos  67  01-12          RADIOLOGY & ADDITIONAL TESTS: Reviewed.

## 2021-01-13 NOTE — PROGRESS NOTE ADULT - ATTENDING COMMENTS
Initial attending contact date 21 on morning bedside rounds. See attending addendum to HPI for initial attending contact documentation.  See NP note written above, for details. I reviewed the NP documentation.  I have personally seen and examined this patient today.  I reviewed vitals, labs, medications, cardiac studies and additional imaging.  I agree with the NP findings and plans as written above with the following additions/amendments:  Patient with h/o ETOH abuse   Presented as transfer from [facility] with witnessed fall, elevated troponin  Currently HDS, asx. unable to consent for ischemic evaluation given lack of capacity  Plan for:  DAPT with ASA/ Plavix   Lopressor 25 BID  48hr heparin gtt  Atorva 80 qHS  MVI, thiamine, folate  Obtain TTE for cardiac structural assessment  PT eval given falls and h/o ETOH abuse  SW consult to assist with identifying HCP/next of kin and patient personal information (confirmation of name//insurance, etc)  TOÑITO Spicer.  Cardiology Attending
Urine clear; agree with plan above.
Agree with plan.
slightly more calm and cooperative today although still confused / disoriented  no further neuro workup  call back with further questions
I have personally seen, examined, and participated in the care of this patient.  I have reviewed all pertinent clinical information, including history, physical exam, plan and the NP/PA’s note and agree except as noted.  82M w/ h/o ETOH abuse, A/O x1-2 admitted s/p fall, NSTEMI, unable to consent for [facility]  - unable to contact any family/HCP/friends, SW to assist  - on OMT for now,  hep gtt dc'd this AM  - retaining urine,  f/u appreciated, to continue CBI for now, no intervention, traumatic hematuria improved  - CWOL protocol  - VSS, DC tele  - if no furhter ischemic w/u planned, can txfr to medicine for futher  care  I was physically present for the key portions of the evaluation and management (E/M) service provided.  I agree with the above history, physical, and plan which I have reviewed and edited where appropriate.     35 minutes spent on total encounter; more than 50% of the visit was spent counseling and/or coordinating care by the attending physician.     Plan discussed with cardiac team.
pt seen and examined by me case d/w housestaff agree with VS, PE, assessment and plan as above with following additives    1- acute blood loss anemia- 2/2 hematoma  holding plavix  monitor h/h   vascular consulted  2- Metabolic encephalopathy- pt appears to be at baseline mental status now  3- Gustavo- improving  4- Urinary obstruction- s/p whitt placement   5- NSTEMI- on asa/statin/b blocker    pt is medically ready for dc tomorrow, ENOC
Attending Attestation:    I have personally seen, examined, and participated in the care of this patient.  I have reviewed all pertinent clinical information, including history, physical exam, plan and the NP/PA’s note and agree except as noted.  82M w/ h/o ETOH abuse, A/O x1-2 admitted s/p fall, NSTEMI, unable to consent for [facility]  - unable to contact any family/HCP/friends, SW to assist  - on OMT for now, DC hep gtt 3AM 1/10/21  - retaining urine, pulled out whitt, seen by SRAVAN, continuous irrigation for now  - CWOL protocol  - VSS  I was physically present for the key portions of the evaluation and management (E/M) service provided.  I agree with the above history, physical, and plan which I have reviewed and edited where appropriate.     35 minutes spent on total encounter; more than 50% of the visit was spent counseling and/or coordinating care by the attending physician.     Plan discussed with cardiac team.
See NP note written above, for details. I reviewed the NP documentation.  I have personally seen and examined this patient today .  I reviewed vitals, labs, medications, cardiac studies and additional imaging.  I agree with the NP findings and plans as written above with the following additions/amendments:  Patient with h/o ETOH abuse. Presented as transfer from [facility] with witnessed fall, elevated troponin. Currently HDS, asx.  Completed 48hr medical management for demand ischemic Type 2 nstemi. HDS and asx from cardiac standpoint. Patient with urinary retention, episodes of delirium and agitation requiring administration of IV antipsychotics.   Plan for:  Cont ASA 81 mg daily  DC Plavix given acute on chronic anemia of unclear etiology  Check iron studies, PBS, retic count  Lopressor 25 BID -->Toprol-XL 50 daily  s/p 48hr heparin gtt  Atorva 80 qHS  MVI, thiamine, folate  -->augment thiamine dose per med consult team  PT eval given falls and h/o ETOH abuse -->rec ENOC  SW consult to assist with identifying HCP/next of kin and patient personal information (confirmation of name//insurance, etc)  Med consult for consideration for transfer to medicine service for further mangement of urinary retention requiring whitt and CBI, delirium/agitated dementia, and acute on chronic anemia  TOÑITO Spicer.  Cardiology Attending

## 2021-01-13 NOTE — PROGRESS NOTE ADULT - SUBJECTIVE AND OBJECTIVE BOX
OVERNIGHT EVENTS: dwayne.     SUBJECTIVE / INTERVAL HPI: Patient seen and examined at bedside. ROS unable to be obtained as patient non-communicative.     VITAL SIGNS:  Vital Signs Last 24 Hrs  T(C): 36.3 (13 Jan 2021 05:50), Max: 36.9 (12 Jan 2021 14:47)  T(F): 97.3 (13 Jan 2021 05:50), Max: 98.4 (12 Jan 2021 14:47)  HR: 74 (13 Jan 2021 05:50) (66 - 76)  BP: 171/68 (13 Jan 2021 05:50) (136/73 - 171/68)  BP(mean): --  RR: 19 (13 Jan 2021 05:50) (17 - 19)  SpO2: 98% (13 Jan 2021 05:50) (98% - 99%)    PHYSICAL EXAM:    General: nad, asleep and arousable, however, not responding to questions appropriately  HEENT: NC/AT; mucus memb dry   Neck: supple  Cardiovascular: +S1/S2, RRR no murmurs appreciated   Respiratory: CTA B/L on 2L NC    Gastrointestinal: soft, NT/ND; +BSx4  Hip: Right hip ecchymosis, extending to right upper thigh and lower abd/groin site, slightly tender to palpation   Extremities: WWP; no edema b/l   Vascular: 2+ radial, PT pulses B/L  Neurological: awake alert, not answering orientation questions, moving all extremities spontaneously but not to command     MEDICATIONS:  MEDICATIONS  (STANDING):  aspirin enteric coated 81 milliGRAM(s) Oral daily  atorvastatin 80 milliGRAM(s) Oral at bedtime  belladonna 16.2 mG/opium 30 mg Suppository 1 Suppository(s) Rectal every 8 hours  clopidogrel Tablet 75 milliGRAM(s) Oral daily  finasteride 5 milliGRAM(s) Oral daily  metoprolol tartrate 25 milliGRAM(s) Oral every 12 hours  OLANZapine 2.5 milliGRAM(s) Oral every 24 hours  oxybutynin 5 milliGRAM(s) Oral every 8 hours  tamsulosin 0.4 milliGRAM(s) Oral at bedtime    MEDICATIONS  (PRN):      ALLERGIES:  Allergies    No Known Allergies    Intolerances        LABS:                        9.0    10.32 )-----------( 167      ( 13 Jan 2021 07:04 )             29.4     01-13    145  |  110<H>  |  28<H>  ----------------------------<  124<H>  4.4   |  25  |  1.28    Ca    8.1<L>      13 Jan 2021 07:04  Phos  2.9     01-12  Mg     1.8     01-13    TPro  5.4<L>  /  Alb  2.9<L>  /  TBili  0.8  /  DBili  x   /  AST  25  /  ALT  15  /  AlkPhos  67  01-12        CAPILLARY BLOOD GLUCOSE          RADIOLOGY & ADDITIONAL TESTS: Reviewed.    ASSESSMENT:    PLAN:

## 2021-01-13 NOTE — DISCHARGE NOTE PROVIDER - CARE PROVIDERS DIRECT ADDRESSES
,[email or web address],DirectAddress_Unknown ,DirectAddress_Unknown ,DirectAddress_Unknown,DirectAddress_Unknown

## 2021-01-13 NOTE — PROGRESS NOTE ADULT - PROBLEM SELECTOR PLAN 6
Patient last assessed: 1/12/2021  to manage: GOC/AD, symptoms, and support.  30 Minutes; Start: 0700am End: 0730am , of non-face-to-face prolonged service provided that relates to (face-to-face) care that has or will occur and ongoing patient management, including one or more of the following:   - Reviewed records from other physicians or other health care professional services, including one or more of the following: other medical records and diagnostic / radiology study results

## 2021-01-13 NOTE — PROGRESS NOTE ADULT - PROBLEM SELECTOR PLAN 5
Patient currently full code. Will reach out to [facility] for GOC conversation. Plan is for patient to go to [facility]. Patient currently full code. Will reach out to [facility] for GOC conversation. Plan is for patient to go to [facility]. Spoke to [facility], awaiting callback from guardian to discuss GOC.

## 2021-01-13 NOTE — DISCHARGE NOTE PROVIDER - PROVIDER TOKENS
PROVIDER:[TOKEN:[[id number]],FOLLOWUP:[2 weeks]],PROVIDER:[TOKEN:[[id number]],FOLLOWUP:[2 weeks]] PROVIDER:[TOKEN:[[id number]],FOLLOWUP:[2 weeks]] PROVIDER:[TOKEN:[[id number]],FOLLOWUP:[2 weeks]],FREE:[LAST:[Tristian],FIRST:[Karen JUAN],PHONE:[phone]:[phone]],ADDRESS:[[place] [address]],SCHEDULEDAPPT:[01/22/2021],SCHEDULEDAPPTTIME:[01:00 PM]]

## 2021-01-13 NOTE — PROGRESS NOTE ADULT - PROBLEM SELECTOR PLAN 1
PPS 60%. Skin care PRN. Assist with ADL's PRN. Appreciate PT involvement who recommends [facility]. His guardianship agency prefers [facility]OC while they attempt to get 24 hour help at home.

## 2021-01-13 NOTE — DISCHARGE NOTE PROVIDER - NSDCCPCAREPLAN_GEN_ALL_CORE_FT
PRINCIPAL DISCHARGE DIAGNOSIS  Diagnosis: Anemia  Assessment and Plan of Treatment: Anemia is the medical term for when a person has too few red blood cells. Red blood cells are the cells in your blood that carry oxygen. If you have too few red blood cells, your body does not get all the oxygen it needs. Most people with anemia have no symptoms. They find out they have it after their doctor does blood tests for another reason. People who do have symptoms might feel tired or weak, especially if they try to exercise or have headaches. If you experience these symptoms you should see your primary care provider for further evaluation.   We believe that your anemia is due to the hematoma or bruise in your leg that you sustained after you fell. We consulted the vascular surgeons who took a look and did not recommend any surgical intervention. Please continue to monitor your hematoma to make sure it isn't getting larger.      SECONDARY DISCHARGE DIAGNOSES  Diagnosis: Non-ST elevation MI (NSTEMI)  Assessment and Plan of Treatment: NSTEMI is considered a mild heart attack in that it is caused by the partial blockage of a major coronary artery or a blockage of a minor artery. We treated your NSTEMI with medical management, meaning, you did not undergo a procedure. We gave you heparin, which is a blood thinner. Afterwards, you will be maintained on Aspirin 81mg, Plaviz 75mg, and Atorvastatin 80mg. Please see your cardiologist to follow up on these medications.    Diagnosis: Hematuria  Assessment and Plan of Treatment: You developed bloody urine after a whitt catheter was placed to releive obstruction that you had. The Urology team was following you and did a continuous bladder irrigation, meaning they flushed sterile fluid into your bladder to clear it. You will be discharged with a whitt.     PRINCIPAL DISCHARGE DIAGNOSIS  Diagnosis: Anemia  Assessment and Plan of Treatment: Anemia is the medical term for when a person has too few red blood cells. Red blood cells are the cells in your blood that carry oxygen. If you have too few red blood cells, your body does not get all the oxygen it needs. Most people with anemia have no symptoms. They find out they have it after their doctor does blood tests for another reason. People who do have symptoms might feel tired or weak, especially if they try to exercise or have headaches. If you experience these symptoms you should see your primary care provider for further evaluation.   We believe that your anemia is due to the hematoma or bruise in your leg that you sustained after you fell as well as the blood in the urine that has since resolved. We consulted the vascular surgeons who took a look and did not recommend any surgical intervention. Please continue to monitor your hematoma to make sure it isn't getting larger.      SECONDARY DISCHARGE DIAGNOSES  Diagnosis: Hematuria  Assessment and Plan of Treatment: You developed bloody urine after a whitt catheter was placed to releive obstruction that you had. The Urology team was following you and did a continuous bladder irrigation, meaning they flushed sterile fluid into your bladder to clear it. You will be discharged with a whitt.    Diagnosis: Non-ST elevation MI (NSTEMI)  Assessment and Plan of Treatment: NSTEMI is considered a mild heart attack in that it is caused by the partial blockage of a major coronary artery or a blockage of a minor artery. We treated your NSTEMI with medical management, meaning, you did not undergo a procedure. We gave you heparin, which is a blood thinner. Afterwards, you will be maintained on Aspirin 81mg, and Atorvastatin 80mg. Please see your cardiologist to follow up on these medications.    Diagnosis: Dementia  Assessment and Plan of Treatment: You were found to present altered intermittently agitated, for which we ruled out other dangerous causes likestroke with CT head, and vitamin deficiency causes. We also consulted neurology who also agreed that it  was likely all secondary to progressive Dementia with agitation. We started you on zypreexa 2.5mg at bedtime to help with agitation. Pelase only take 1 tab, as taking 2 tabs made you very tired/lethargic.

## 2021-01-13 NOTE — PROGRESS NOTE ADULT - PROBLEM SELECTOR PLAN 8
F: None   E: Replete for K<4 Mag<2  N: DASH Diet  A: Holding in setting of anemia   Disposition: RMF    #Goals of care  - Patient with progressive dementia unable to participate in GOC discussion as per AM team.   - Reach out to patient's  (Divya Gutierrez [phone]) to determine if any medical directives.  - F/u palliative consult recs in AM

## 2021-01-13 NOTE — DISCHARGE NOTE PROVIDER - HOSPITAL COURSE
DO NOT DELETE  89YOM Poor historian with PMH of HTN, arthritis who was BIBEMS to [facility] on 1/7 s/p witness fall (collapsed with walker) with no subsequent LOC, but unclear head trauma. Found to have NSTEMI for which he underwent medical management, hypertensive urgency, and urinary rentention, for which he underwent traumatic whitt placement by Urology with subsequent hematuria. Course complicated by progressive anemia likely secondary to hematoma on right upper thigh that was seen and evaluated by vascular surgery with no intervention recommended. Finally, as per collateral obtained from family, patient has een undergoing workup and evaluation for progressive dementia, here presenting with encephalopathy and agitation for which he has required Zyprexa and wrist restraints at night. He is stable for discharge to [facility] facility.     Problem List/Main Diagnoses (system-based):   #Anemia.   - Normocytic anemia with iron labs consistent with AOCD however, could also be mixed in light of hematoma.   - Vascular consulted for right hip hematoma- no acute intervention.  - hg stable at 8.5-9.    Metabolic encephalopathy.   - DDX likely progressive dementia and or/concurrent delirium vs less likely metabolic.     NSTEMI (non-ST elevated myocardial infarction).   - S/p medical management for NSTEMI given non-consentable for cardiac cath in light of AMS on admission. Trops peaked. S/p IV Heparin x 48hrs for NSTEMI.  - c/w ASA 81mg qd and Atorvastatin 80mg qd, restarted Plavix 75mg qd as hg stable.  - on metoprolol tartrate 25mg BID  - ECHO 1/8: Borderline normal LVSF- EF 50-55%, Grade 1LV diastolic dysfunction, mild TR.    Urinary retention.    - Finasteride 5mg and Flomax 0.4mg    #Hematuria  S/p CBI by urology, since resolved.     NANCY (acute kidney injury). 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   - BUN/Cr Improving.     New medications: Aspirin 81mg, Plavix 75, Lopressor 25, Lipitor 80mg, Finasteride 5mg, Tamsulosin 0.4mg    Labs to be followed outpatient: CBC     Exam to be followed outpatient: Hematoma DO NOT DELETE  89YOM Poor historian with PMH of HTN, arthritis who was BIBEMS to [facility] on 1/7 s/p witness fall (collapsed with walker) with no subsequent LOC, but unclear head trauma. Found to have NSTEMI for which he underwent medical management, hypertensive urgency, and urinary rentention, for which he underwent traumatic whitt placement by Urology with subsequent hematuria. Course complicated by progressive anemia likely secondary to hematoma on right upper thigh that was seen and evaluated by vascular surgery with no intervention recommended. Finally, as per collateral obtained from family, patient has een undergoing workup and evaluation for progressive dementia, here presenting with encephalopathy and agitation for which he has required Zyprexa and wrist restraints at night. He is stable for discharge to [facility] facility.     Problem List/Main Diagnoses (system-based):   #Anemia.   - Normocytic anemia with iron labs consistent with AOCD however, could also be mixed in light of hematoma.   - Vascular consulted for right hip hematoma- no acute intervention.  - hg stable at 8.5-9.    Metabolic encephalopathy.   - DDX likely progressive dementia and or/concurrent delirium vs less likely metabolic.     NSTEMI (non-ST elevated myocardial infarction).   - S/p medical management for NSTEMI given non-consentable for cardiac cath in light of AMS on admission. Trops peaked. S/p IV Heparin x 48hrs for NSTEMI.  - c/w ASA 81mg qd and Atorvastatin 80mg qd, restarted Plavix 75mg qd as hg stable.  - on metoprolol tartrate 25mg BID  - ECHO 1/8: Borderline normal LVSF- EF 50-55%, Grade 1LV diastolic dysfunction, mild TR.    Urinary retention.    - Finasteride 5mg and Flomax 0.4mg    #Hematuria  S/p CBI by urology, since resolved.     NANCY (acute kidney injury). 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   - BUN/Cr Improving.     New medications: Aspirin 81mg, Plavix 75, Lopressor 25, Lipitor 80mg, Finasteride 5mg, Tamsulosin 0.4mg, Olanzapine 2.5mg     Labs to be followed outpatient: CBC     Exam to be followed outpatient: Hematoma DO NOT DELETE  89YOM Poor historian with PMH of HTN, arthritis who was BIBEMS to [facility] on 1/7 s/p witness fall (collapsed with walker) with no subsequent LOC, but unclear head trauma. Found to have NSTEMI for which he underwent medical management, hypertensive urgency, and urinary rentention, for which he underwent traumatic whitt placement by Urology with subsequent hematuria. Course complicated by progressive anemia likely secondary to hematoma on right upper thigh that was seen and evaluated by vascular surgery with no intervention recommended. Finally, as per collateral obtained from family, patient has een undergoing workup and evaluation for progressive dementia, here presenting with encephalopathy and agitation for which he has required Zyprexa and wrist restraints at night. He is stable for discharge to [facility] facility.     Problem List/Main Diagnoses (system-based):   #Anemia.   - Normocytic anemia with iron labs consistent with AOCD however, could also be mixed in light of hematoma (as well as hematuria that has since resolved).  - Vascular consulted for right hip hematoma- no acute intervention.  - hg stable at 8.5-9.    Metabolic encephalopathy.   - DDX likely progressive dementia and or/concurrent delirium vs less likely metabolic with agitation. Will c/w zyprexa 2.5mg at bedtime (seems sensitive to 5mg/too lethargic therefore keep low dose)    NSTEMI (non-ST elevated myocardial infarction).   - S/p medical management for NSTEMI given non-consentable for cardiac cath in light of AMS on admission. Trops peaked. S/p IV Heparin x 48hrs for NSTEMI.  - c/w ASA 81mg qd and Atorvastatin 80mg qd  - on metoprolol tartrate 25mg BID  - ECHO 1/8: Borderline normal LVSF- EF 50-55%, Grade 1LV diastolic dysfunction, mild TR.    Urinary retention.    - Finasteride 5mg and Flomax 0.4mg, requiring multiple straight caths and failed TOV, therefore will be discharged with whitt to be followed up on outpatient by urology.     #Hematuria  S/p CBI by urology, since resolved.     NANCY (acute kidney injury). 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   - BUN/Cr Improving.     New medications: Aspirin 81mg, Lopressor 25, Lipitor 80mg, Finasteride 5mg, Tamsulosin 0.4mg, Olanzapine 2.5mg     Labs to be followed outpatient: CBC     Exam to be followed outpatient: Hematoma, whitt

## 2021-01-13 NOTE — PROGRESS NOTE ADULT - ASSESSMENT
88 y/o male w hx of HTN, arthritis, ?ETOH abuse, who presents after a witnessed fall in the street on 1/7, admitted to cardiac tele for NSTEMI; now on RMF. Neurology consulted for altered mental status    #Toxic metabolic encephalopathy. Patient experienced fall at home; CT head on admission without acute pathology. As per niece, patient has had a work-up outpatient for dementia. There was concern for alcohol abuse, but on exam, patient without tremor or tongue fasciculations. Now appears to be more alert, but continues to be confused and unable to answer questions.   - most likely to have delirium in the setting of dementia   - B12, folate, TSH normal; ammonia negative    Neurology will sign off. Please re-consult with additional questions.    88 y/o male w hx of HTN, arthritis, ?ETOH abuse, who presents after a witnessed fall in the street on 1/7, admitted to cardiac tele for NSTEMI; now on RMF. Neurology consulted for altered mental status    #Toxic metabolic encephalopathy. Patient experienced fall at home; CT head on admission without acute pathology. As per niece, patient has had a work-up outpatient for dementia. There was concern for alcohol abuse, but on exam, patient without tremor or tongue fasciculations. Now appears to be more alert, but continues to be confused and unable to answer questions.   - most likely to have delirium in the setting of dementia   - B12, folate, TSH normal; ammonia negative    Neurology will sign off. Please re-consult with additional questions.

## 2021-01-13 NOTE — DISCHARGE NOTE PROVIDER - NSDCMRMEDTOKEN_GEN_ALL_CORE_FT
aspirin 81 mg oral delayed release tablet: 1 tab(s) orally once a day  atorvastatin 80 mg oral tablet: 1 tab(s) orally once a day (at bedtime)  belladonna-opium 16.2 mg-30 mg rectal suppository: 1 suppository(ies) rectal every 8 hours  clopidogrel 75 mg oral tablet: 1 tab(s) orally once a day  finasteride 5 mg oral tablet: 1 tab(s) orally once a day  metoprolol tartrate 25 mg oral tablet: 1 tab(s) orally every 12 hours  OLANZapine 2.5 mg oral tablet: 1 tab(s) orally every 24 hours  oxybutynin 5 mg oral tablet: 1 tab(s) orally every 8 hours  tamsulosin 0.4 mg oral capsule: 1 cap(s) orally once a day (at bedtime)

## 2021-01-13 NOTE — DISCHARGE NOTE PROVIDER - NSDCFUADDAPPT_GEN_ALL_CORE_FT
Please note that the office of your Urology Provider, Dr. Bib Fishman will contact you to schedule an appointment with you within 2 weeks of discharge from the hospital.    Appointment was facilitated by Ms. PILO Smith, Referral Coordinator. (1) Please note that the office of your Urology Provider, Dr. Bib Fishman will contact you to schedule an appointment with you within 2 weeks of discharge from the hospital.    Appointment was facilitated by Ms. PILO Smith, Referral Coordinator.    (2) Please follow up with your Cardiology Provider, Dr. Karen Lubin at [address] [place] [address] on 01/22/2021 at 1:00pm.    Please bring your Insurance card, Photo ID, current list of medications and Discharge paperwork to your appointment.    Appointment was scheduled by Ms. PILO Smith, Referral Coordinator.

## 2021-01-13 NOTE — PROGRESS NOTE ADULT - ASSESSMENT
89M (poor historian), PMHx HTN, arthritis, presented on 1/7 s/p witnessed fall, admitted for NSTEMI s/p 48 hr heparin gtt then transitioned to ASA/plavix,, course c/b hematuria which has resolved s/p CBI, initially with downtrending Hgb 11s->8.1 now improving without transfusion (Fe labs c/w AOCD), who was noted to have a subcutaneous hematoma of R hip demonstrated on CT. This hematoma is soft, likely stable given uptrending H/H, without clinical or laboratory signs of infection. Compression of peripheral nerves is unlikely given nonfocal exam. Hgb remains stable.    Recommendations:  - No vascular surgical intervention at this time  - Trend CBC and transfuse as needed  - Monitor for signs of infection, neurological deficits, extension of ecchymosis  - Timeline to resume AC per primary  - Vascular surgery will sign off at this time. Please re-consult if needed.  - Discussed with chief on call, attending surgeon

## 2021-01-13 NOTE — DISCHARGE NOTE PROVIDER - CARE PROVIDER_API CALL
Geovany Nagel)  Internal Medicine  [address], [address] [place] [id number]  Phone: [phone]  Fax: [phone]  Follow Up Time: 2 weeks    Bib Fishman)  Urology [facility]  [address] [address]  Phone: [phone]  Fax: [phone]  Follow Up Time: 2 weeks   Bib Fishman)  Urology [facility]  [address] [address]  Phone: [phone]  Fax: [phone]  Follow Up Time: 2 weeks   Bib Fishman)  Urology [facility]  [address] [address]  Phone: [phone]  Fax: [phone]  Follow Up Time: 2 weeks    Karen Lubin  CARDIOLOGY  [address] [address]  Phone: [phone]  Fax: [phone]  Scheduled Appointment: 01/22/2021 01:00 PM

## 2021-01-13 NOTE — PROGRESS NOTE ADULT - SUBJECTIVE AND OBJECTIVE BOX
SUBJECTIVE: Patient seen and examined at bedside. No specific complaints. Still providing inappropriate answers to questions, however denies pain and states that he has no problems. Resting comfortably in bed listening to music which he says is "peaceful"    aspirin enteric coated 81 milliGRAM(s) Oral daily  clopidogrel Tablet 75 milliGRAM(s) Oral daily  metoprolol tartrate 25 milliGRAM(s) Oral every 12 hours  tamsulosin 0.4 milliGRAM(s) Oral at bedtime    MEDICATIONS  (PRN):      I&O's Detail    12 Jan 2021 07:01  -  13 Jan 2021 07:00  --------------------------------------------------------  IN:  Total IN: 0 mL    OUT:    Indwelling Catheter - Urethral (mL): 1350 mL  Total OUT: 1350 mL    Total NET: -1350 mL      13 Jan 2021 07:01  -  13 Jan 2021 12:51  --------------------------------------------------------  IN:    Oral Fluid: 420 mL  Total IN: 420 mL    OUT:  Total OUT: 0 mL    Total NET: 420 mL          T(C): 36.3 (01-13-21 @ 05:50), Max: 36.9 (01-12-21 @ 14:47)  HR: 74 (01-13-21 @ 05:50) (66 - 76)  BP: 171/68 (01-13-21 @ 05:50) (136/73 - 171/68)  RR: 19 (01-13-21 @ 05:50) (17 - 19)  SpO2: 98% (01-13-21 @ 05:50) (98% - 99%)    GENERAL: NAD, Resting comfortably in bed, awake, opens eyes spontaneously  HEENT: NCAT, MMM, Normal conjunctiva, PERRL  RESP: Nonlabored breathing, No respiratory distress, on NC  CARD: Normal rate, Normal peripheral perfusion  GI: Soft, ND, NT, No guarding, No rebound tenderness  EXTREM: WWP, 2+ fem/pop/DP/PT bilaterally, large purple ecchymosis extending from R thigh to R flank which is non-tender and soft  SKIN: No rashes, no lesions  NEURO: AAOx2, No focal motor or sensory deficits  PSYCH: Pleasant      LABS:                        9.0    10.32 )-----------( 167      ( 13 Jan 2021 07:04 )             29.4     01-13    145  |  110<H>  |  28<H>  ----------------------------<  124<H>  4.4   |  25  |  1.28    Ca    8.1<L>      13 Jan 2021 07:04  Phos  2.9     01-12  Mg     1.8     01-13    TPro  5.4<L>  /  Alb  2.9<L>  /  TBili  0.8  /  DBili  x   /  AST  25  /  ALT  15  /  AlkPhos  67  01-12

## 2021-01-13 NOTE — PROGRESS NOTE ADULT - ASSESSMENT
89YOM Poor historian with PMH of HTN, arthritis who was BIBEMS to [facility] on 1/7 s/p witness fall (collapsed with walker) with no subsequent LOC, but unclear head trauma,CTH neg, admitted to cardiac tele for NSTEMI (wellens sign on EKG/tropinemia) c/b HTN urgency with decision for medical treatment as not consentable (s/p 48h heparin gtt and transitioned to ASA/Plavix and Lipito 80qd). Course c/b urinary retention s/p whitt insertion by Urology with subsequent hematuria that has since resolved s/p CBI (whitt currently in place). Course also c/b normocytic anemia with iron labs consistent with AOCD (drop in Hb from 14--> 11s range 1/9--> 9.8 1/10--> 7.8-8.1 1/11 progressive decrease, no reported melena or hematochezia, but had hematuria which has since resolved), on exam with R-hip ecchymosis concern for possible localized hematoma 2/2 fall for which R-CT-hip ordered upon stepdown to [facility].

## 2021-01-13 NOTE — PROGRESS NOTE ADULT - PROBLEM SELECTOR PLAN 3
Initial EKG @[facility] w/out acute changes, repeat EKG@[facility] SR @85BPM w/ PACs and biphasic TW V2-V3 c/w Wellens sign. S/p medical management for NSTEMI given non-consentable for cardiac cath in light of AMS on admission. Trops peaked. S/p IV Heparin x 48hrs for NSTEMI.  - c/w ASA 81mg qd and Atorvastatin 80mg qd, restarted plavix 75.    - on metoprolol tartrate 25mg BID  - ECHO 1/8: Borderline normal LVSF- EF 50-55%, Grade 1LV diastolic dysfunction, mild TR.  - F/u cardiology recs

## 2021-01-13 NOTE — PROGRESS NOTE ADULT - SUBJECTIVE AND OBJECTIVE BOX
CURTIS MCCOY             MRN-[id number]    CC: s/p witnessed fall/elevated cardiac enzymes    HPI:  89 yr old Beninese/English speaking M, hx of ETOH abuse, POOR HISTORIAN, BIBEMS to [facility] 1/7/20 s/p witnessed fall by bystanders. Patient reportedly was walking in the street with his walker when he suddenly collapsed to the ground. Patient denied any prodromal dizziness, palpitations, chest pain, SOB, headache, visual changes or loss of sensation. Per EMS, no reported LOC and unknown if pt struck his head. Patient denies any current pain, only endorsing dry mouth and urge to urinate. Pt reportedly lives alone in an apartment and denies having ETOH today. Unable to obtain further information as patient repeatedly only states "I'm old, I don't know."  In [facility], BP: 193/109, HR: 73, RR:18, Temp: 98.5F, O2 sat: 98% RA, Fingerstick 154. EKG revealed NSR@97BPM with PVC noted, no acute ischemic changes. CXR unremarkable. CT head revealed no acute intracranial hemorrhage, mass effect or demarcated territorial infarction. Chronic microvessel ischemic changes. Labs notable for: Alcohol level <3, WBC 13.95 with slight left shift, Na 148, BUN/Cr 52/1.67, Lactate 2.2 with repeat level 1.2 (after IVF bolus), BNP 11,364, Initial Troponin I 0.241 and second set 0.395. UA: large blood, >10 RBC, bacteria present, negative nitrite/LE. Blood cultures drawn- pending. COVID PCR negative.  Patient treated with 2 liter IVF bolus, Azithromycin 500mg IV x 1 dose, Ceftriaxone 1g IV x 1 dose, Norvasc 5mg PO x 1 dose and ASA 325mg PO x 1 dose.  Patient was straight cathed for urinary retention with ~1L of urine retrieved.  Patient stable, now transferred to [address] for cardiac telemetry, serial cardiac enzymes and further ischemic work-up.      (07 Jan 2021 23:41)    SUBJECTIVE: Patient resting in no distress, remains restrained in bed.     ROS:  DYSPNEA: N	  NAUS/VOM: N  SECRETIONS: N  AGITATION: N  Pain (Y/N):     N  -Provocation/Palliation: n/a   -Quality/Quantity: n/a   -Radiating: n/a   -Severity:  n/a   -Timing/Frequency:   n/a   -Impact on ADLs: n/a     OTHER REVIEW OF SYSTEMS: unable to obtain.  UNABLE TO OBTAIN  due to: cognitive impairment    PEx:  T(C): 36.3 (01-13-21 @ 05:50), Max: 36.9 (01-12-21 @ 14:47)  HR: 74 (01-13-21 @ 05:50) (66 - 76)  BP: 171/68 (01-13-21 @ 05:50) (136/73 - 171/68)  RR: 19 (01-13-21 @ 05:50) (17 - 19)  SpO2: 70.3kg    G  GENERAL:  [ x]Alert  [ x]Oriented x 1   [ ]Lethargic  [ ]Cachexia  [ ]Unarousable  [x ]Verbal  [ ]Non-Verbal  Behavioral:   [ ] Anxiety  [ ] Delirium [ ] Agitation [ x] Other - calm  HEENT:  [ ]Normal   [ x]Dry mouth   [ ]ET Tube/Trach  [ ]Oral lesions  PULMONARY:   [x ]Clear  [ ]Tachypnea  [ ]Audible excessive secretions   [ ]Rhonchi        [ ]Right [ ]Left [ ]Bilateral  [ ]Crackles        [ ]Right [ ]Left [ ]Bilateral  [ ]Wheezing     [ ]Right [ ]Left [ ]Bilateral  CARDIOVASCULAR:    [x ]Regular [ ]Irregular [ ]Tachy  [ ]Greg [ ]Murmur [ ]Other  GASTROINTESTINAL:  [x ]Soft  [ ]Distended   [ ]+BS  [x ]Non tender [ ]Tender  [ ]PEG [ ]OGT/ NGT  Last BM:   GENITOURINARY:  [ ]Normal [ ] Incontinent   [ ]Oliguria/Anuria   [x ]Batres  MUSCULOSKELETAL:   [ ]Normal   [x ]Weakness  [ ]Bed/Wheelchair bound [ ]Edema  NEUROLOGIC:   [ ]No focal deficits  [x ] Cognitive impairment  [ ] Dysphagia [ ]Dysarthria [ ] Paresis [ ]Other   SKIN:   [x ]Normal   [ ]Pressure ulcer(s)  [ ]Rash      ALLERGIES: No Known Allergies      OPIATE NAÏVE (Y/N):    MEDICATIONS: REVIEWED  MEDICATIONS  (STANDING):  aspirin enteric coated 81 milliGRAM(s) Oral daily  atorvastatin 80 milliGRAM(s) Oral at bedtime  belladonna 16.2 mG/opium 30 mg Suppository 1 Suppository(s) Rectal every 8 hours  clopidogrel Tablet 75 milliGRAM(s) Oral daily  finasteride 5 milliGRAM(s) Oral daily  magnesium sulfate  IVPB 2 Gram(s) IV Intermittent once  metoprolol tartrate 25 milliGRAM(s) Oral every 12 hours  OLANZapine 2.5 milliGRAM(s) Oral every 24 hours  oxybutynin 5 milliGRAM(s) Oral every 8 hours  tamsulosin 0.4 milliGRAM(s) Oral at bedtime    MEDICATIONS  (PRN):      LABS: REVIEWED  CBC:                        9.0    10.32 )-----------( 167      ( 13 Jan 2021 07:04 )             29.4     CMP:    01-13    145  |  110<H>  |  28<H>  ----------------------------<  124<H>  4.4   |  25  |  1.28    Ca    8.1<L>      13 Jan 2021 07:04  Phos  2.9     01-12  Mg     1.8     01-13    TPro  5.4<L>  /  Alb  2.9<L>  /  TBili  0.8  /  DBili  x   /  AST  25  /  ALT  15  /  AlkPhos  67  01-12      IMAGING: REVIEWED    ADVANCED DIRECTIVES:            FULL CODE         DECISION MAKER: Patient is unable to mnake decisions.   LEGAL SURROGATE: patient has guardianship from JASA who make decisions on his behalf.    PSYCHOSOCIAL-SPIRITUAL ASSESSMENT:       Reviewed       Care plan unchanged       GOALS OF CARE DISCUSSION       Palliative care info/counseling provided	           Documentation of GOC: Full code  	      AGENCY CHOICE DISCUSSED:               ENOC      REFERRALS	        Unit SW/Case Mgmt       PT/OT

## 2021-01-14 ENCOUNTER — TRANSCRIPTION ENCOUNTER (OUTPATIENT)
Age: 86
End: 2021-01-14

## 2021-01-14 VITALS
DIASTOLIC BLOOD PRESSURE: 73 MMHG | RESPIRATION RATE: 17 BRPM | SYSTOLIC BLOOD PRESSURE: 149 MMHG | TEMPERATURE: 99 F | OXYGEN SATURATION: 94 % | HEART RATE: 71 BPM

## 2021-01-14 PROBLEM — Z00.00 ENCOUNTER FOR PREVENTIVE HEALTH EXAMINATION: Status: ACTIVE | Noted: 2021-01-14

## 2021-01-14 PROBLEM — Z78.9 OTHER SPECIFIED HEALTH STATUS: Chronic | Status: ACTIVE | Noted: 2021-01-07

## 2021-01-14 LAB
ANION GAP SERPL CALC-SCNC: 11 MMOL/L — SIGNIFICANT CHANGE UP (ref 5–17)
BUN SERPL-MCNC: 26 MG/DL — HIGH (ref 7–23)
CALCIUM SERPL-MCNC: 8.3 MG/DL — LOW (ref 8.4–10.5)
CHLORIDE SERPL-SCNC: 108 MMOL/L — SIGNIFICANT CHANGE UP (ref 96–108)
CO2 SERPL-SCNC: 26 MMOL/L — SIGNIFICANT CHANGE UP (ref 22–31)
CREAT SERPL-MCNC: 1.28 MG/DL — SIGNIFICANT CHANGE UP (ref 0.5–1.3)
GLUCOSE SERPL-MCNC: 108 MG/DL — HIGH (ref 70–99)
HCT VFR BLD CALC: 27.9 % — LOW (ref 39–50)
HGB BLD-MCNC: 8.8 G/DL — LOW (ref 13–17)
MAGNESIUM SERPL-MCNC: 2 MG/DL — SIGNIFICANT CHANGE UP (ref 1.6–2.6)
MCHC RBC-ENTMCNC: 30.3 PG — SIGNIFICANT CHANGE UP (ref 27–34)
MCHC RBC-ENTMCNC: 31.5 GM/DL — LOW (ref 32–36)
MCV RBC AUTO: 96.2 FL — SIGNIFICANT CHANGE UP (ref 80–100)
NRBC # BLD: 0 /100 WBCS — SIGNIFICANT CHANGE UP (ref 0–0)
PLATELET # BLD AUTO: 176 K/UL — SIGNIFICANT CHANGE UP (ref 150–400)
POTASSIUM SERPL-MCNC: 4.1 MMOL/L — SIGNIFICANT CHANGE UP (ref 3.5–5.3)
POTASSIUM SERPL-SCNC: 4.1 MMOL/L — SIGNIFICANT CHANGE UP (ref 3.5–5.3)
RBC # BLD: 2.9 M/UL — LOW (ref 4.2–5.8)
RBC # FLD: 13.9 % — SIGNIFICANT CHANGE UP (ref 10.3–14.5)
SODIUM SERPL-SCNC: 145 MMOL/L — SIGNIFICANT CHANGE UP (ref 135–145)
WBC # BLD: 9.31 K/UL — SIGNIFICANT CHANGE UP (ref 3.8–10.5)
WBC # FLD AUTO: 9.31 K/UL — SIGNIFICANT CHANGE UP (ref 3.8–10.5)

## 2021-01-14 PROCEDURE — 99358 PROLONG SERVICE W/O CONTACT: CPT

## 2021-01-14 PROCEDURE — 87086 URINE CULTURE/COLONY COUNT: CPT

## 2021-01-14 PROCEDURE — 73700 CT LOWER EXTREMITY W/O DYE: CPT

## 2021-01-14 PROCEDURE — 87040 BLOOD CULTURE FOR BACTERIA: CPT

## 2021-01-14 PROCEDURE — 85730 THROMBOPLASTIN TIME PARTIAL: CPT

## 2021-01-14 PROCEDURE — 82553 CREATINE MB FRACTION: CPT

## 2021-01-14 PROCEDURE — 82746 ASSAY OF FOLIC ACID SERUM: CPT

## 2021-01-14 PROCEDURE — 83880 ASSAY OF NATRIURETIC PEPTIDE: CPT

## 2021-01-14 PROCEDURE — 84133 ASSAY OF URINE POTASSIUM: CPT

## 2021-01-14 PROCEDURE — 86769 SARS-COV-2 COVID-19 ANTIBODY: CPT

## 2021-01-14 PROCEDURE — 96374 THER/PROPH/DIAG INJ IV PUSH: CPT

## 2021-01-14 PROCEDURE — 83935 ASSAY OF URINE OSMOLALITY: CPT

## 2021-01-14 PROCEDURE — 36415 COLL VENOUS BLD VENIPUNCTURE: CPT

## 2021-01-14 PROCEDURE — 87635 SARS-COV-2 COVID-19 AMP PRB: CPT

## 2021-01-14 PROCEDURE — 83036 HEMOGLOBIN GLYCOSYLATED A1C: CPT

## 2021-01-14 PROCEDURE — 84100 ASSAY OF PHOSPHORUS: CPT

## 2021-01-14 PROCEDURE — 93306 TTE W/DOPPLER COMPLETE: CPT

## 2021-01-14 PROCEDURE — 85027 COMPLETE CBC AUTOMATED: CPT

## 2021-01-14 PROCEDURE — 99239 HOSP IP/OBS DSCHRG MGMT >30: CPT

## 2021-01-14 PROCEDURE — 83550 IRON BINDING TEST: CPT

## 2021-01-14 PROCEDURE — 84484 ASSAY OF TROPONIN QUANT: CPT

## 2021-01-14 PROCEDURE — 82570 ASSAY OF URINE CREATININE: CPT

## 2021-01-14 PROCEDURE — 80053 COMPREHEN METABOLIC PANEL: CPT

## 2021-01-14 PROCEDURE — 97116 GAIT TRAINING THERAPY: CPT

## 2021-01-14 PROCEDURE — 82607 VITAMIN B-12: CPT

## 2021-01-14 PROCEDURE — 84156 ASSAY OF PROTEIN URINE: CPT

## 2021-01-14 PROCEDURE — 97161 PT EVAL LOW COMPLEX 20 MIN: CPT

## 2021-01-14 PROCEDURE — 80076 HEPATIC FUNCTION PANEL: CPT

## 2021-01-14 PROCEDURE — 96375 TX/PRO/DX INJ NEW DRUG ADDON: CPT

## 2021-01-14 PROCEDURE — 86850 RBC ANTIBODY SCREEN: CPT

## 2021-01-14 PROCEDURE — 99285 EMERGENCY DEPT VISIT HI MDM: CPT | Mod: 25

## 2021-01-14 PROCEDURE — 84443 ASSAY THYROID STIM HORMONE: CPT

## 2021-01-14 PROCEDURE — U0005: CPT

## 2021-01-14 PROCEDURE — 99233 SBSQ HOSP IP/OBS HIGH 50: CPT

## 2021-01-14 PROCEDURE — 84300 ASSAY OF URINE SODIUM: CPT

## 2021-01-14 PROCEDURE — 82550 ASSAY OF CK (CPK): CPT

## 2021-01-14 PROCEDURE — 83540 ASSAY OF IRON: CPT

## 2021-01-14 PROCEDURE — 85025 COMPLETE CBC W/AUTO DIFF WBC: CPT

## 2021-01-14 PROCEDURE — 70450 CT HEAD/BRAIN W/O DYE: CPT

## 2021-01-14 PROCEDURE — 86900 BLOOD TYPING SEROLOGIC ABO: CPT

## 2021-01-14 PROCEDURE — 86901 BLOOD TYPING SEROLOGIC RH(D): CPT

## 2021-01-14 PROCEDURE — 82140 ASSAY OF AMMONIA: CPT

## 2021-01-14 PROCEDURE — 83605 ASSAY OF LACTIC ACID: CPT

## 2021-01-14 PROCEDURE — 80048 BASIC METABOLIC PNL TOTAL CA: CPT

## 2021-01-14 PROCEDURE — 82436 ASSAY OF URINE CHLORIDE: CPT

## 2021-01-14 PROCEDURE — 84540 ASSAY OF URINE/UREA-N: CPT

## 2021-01-14 PROCEDURE — 80061 LIPID PANEL: CPT

## 2021-01-14 PROCEDURE — 82962 GLUCOSE BLOOD TEST: CPT

## 2021-01-14 PROCEDURE — 71045 X-RAY EXAM CHEST 1 VIEW: CPT

## 2021-01-14 PROCEDURE — 80307 DRUG TEST PRSMV CHEM ANLYZR: CPT

## 2021-01-14 PROCEDURE — 83735 ASSAY OF MAGNESIUM: CPT

## 2021-01-14 PROCEDURE — 85610 PROTHROMBIN TIME: CPT

## 2021-01-14 PROCEDURE — 84105 ASSAY OF URINE PHOSPHORUS: CPT

## 2021-01-14 PROCEDURE — 81001 URINALYSIS AUTO W/SCOPE: CPT

## 2021-01-14 PROCEDURE — 82728 ASSAY OF FERRITIN: CPT

## 2021-01-14 PROCEDURE — 85045 AUTOMATED RETICULOCYTE COUNT: CPT

## 2021-01-14 PROCEDURE — U0003: CPT

## 2021-01-14 RX ADMIN — FINASTERIDE 5 MILLIGRAM(S): 5 TABLET, FILM COATED ORAL at 13:30

## 2021-01-14 RX ADMIN — Medication 5 MILLIGRAM(S): at 05:53

## 2021-01-14 RX ADMIN — Medication 81 MILLIGRAM(S): at 13:30

## 2021-01-14 RX ADMIN — Medication 5 MILLIGRAM(S): at 13:30

## 2021-01-14 RX ADMIN — ATROPA BELLADONNA AND OPIUM 1 SUPPOSITORY(S): 16.2; 6 SUPPOSITORY RECTAL at 05:53

## 2021-01-14 RX ADMIN — ATROPA BELLADONNA AND OPIUM 1 SUPPOSITORY(S): 16.2; 6 SUPPOSITORY RECTAL at 13:30

## 2021-01-14 RX ADMIN — Medication 25 MILLIGRAM(S): at 05:53

## 2021-01-14 NOTE — PROGRESS NOTE ADULT - REASON FOR ADMISSION
s/p witnessed fall/elevated cardiac enzymes

## 2021-01-14 NOTE — PROGRESS NOTE ADULT - PROBLEM SELECTOR PLAN 2
Patient currrently off restraints and is calm would recommend continuing Zyprexa 2.5mg po at bedtime if fails to help can increase to 5mg at bedtime.

## 2021-01-14 NOTE — PROGRESS NOTE ADULT - PROBLEM SELECTOR PLAN 5
Patient currently full code. Left message for patients guardian, awaiting call back. Patients plan is to go to [facility].

## 2021-01-14 NOTE — DISCHARGE NOTE NURSING/CASE MANAGEMENT/SOCIAL WORK - NSDCFUADDAPPT_GEN_ALL_CORE_FT
(1) Please note that the office of your Urology Provider, Dr. Bib Fishman will contact you to schedule an appointment with you within 2 weeks of discharge from the hospital.    Appointment was facilitated by Ms. PILO Smith, Referral Coordinator.    (2) Please follow up with your Cardiology Provider, Dr. Karen Lubin at [address] [place] [address] on 01/22/2021 at 1:00pm.    Please bring your Insurance card, Photo ID, current list of medications and Discharge paperwork to your appointment.    Appointment was scheduled by Ms. PILO Smith, Referral Coordinator.

## 2021-01-14 NOTE — DISCHARGE NOTE NURSING/CASE MANAGEMENT/SOCIAL WORK - PATIENT PORTAL LINK FT
You can access the FollowMyHealth Patient Portal offered by [facility] by registering at the following website: [email or web address]. By joining [employer]’s FollowMyHealth portal, you will also be able to view your health information using other applications (apps) compatible with our system.

## 2021-01-14 NOTE — PROGRESS NOTE ADULT - PROVIDER SPECIALTY LIST ADULT
Cardiology
Internal Medicine
Neurology
Urology
Urology
Vascular Surgery
Internal Medicine
Cardiology
Palliative Care
Palliative Care
Cardiology
Internal Medicine

## 2021-01-14 NOTE — PROGRESS NOTE ADULT - PROBLEM SELECTOR PLAN 6
Patient last assessed: 1/13/2021  to manage: GOC/AD, symptoms, and support.  30 Minutes; Start: 0730am End: 0800am , of non-face-to-face prolonged service provided that relates to (face-to-face) care that has or will occur and ongoing patient management, including one or more of the following:   - Reviewed records from other physicians or other health care professional services, including one or more of the following: other medical records and diagnostic / radiology study results

## 2021-01-14 NOTE — PROGRESS NOTE ADULT - SUBJECTIVE AND OBJECTIVE BOX
CURTIS MCCOY             [id number]    CC: s/p witnessed fall/elevated cardiac enzymes    HPI:  89 yr old Zimbabwean/English speaking M, hx of ETOH abuse, POOR HISTORIAN, BIBEMS to [facility] 1/7/20 s/p witnessed fall by bystanders. Patient reportedly was walking in the street with his walker when he suddenly collapsed to the ground. Patient denied any prodromal dizziness, palpitations, chest pain, SOB, headache, visual changes or loss of sensation. Per EMS, no reported LOC and unknown if pt struck his head. Patient denies any current pain, only endorsing dry mouth and urge to urinate. Pt reportedly lives alone in an apartment and denies having ETOH today. Unable to obtain further information as patient repeatedly only states "I'm old, I don't know."  In [facility], BP: 193/109, HR: 73, RR:18, Temp: 98.5F, O2 sat: 98% RA, Fingerstick 154. EKG revealed NSR@97BPM with PVC noted, no acute ischemic changes. CXR unremarkable. CT head revealed no acute intracranial hemorrhage, mass effect or demarcated territorial infarction. Chronic microvessel ischemic changes. Labs notable for: Alcohol level <3, WBC 13.95 with slight left shift, Na 148, BUN/Cr 52/1.67, Lactate 2.2 with repeat level 1.2 (after IVF bolus), BNP 11,364, Initial Troponin I 0.241 and second set 0.395. UA: large blood, >10 RBC, bacteria present, negative nitrite/LE. Blood cultures drawn- pending. COVID PCR negative.  Patient treated with 2 liter IVF bolus, Azithromycin 500mg IV x 1 dose, Ceftriaxone 1g IV x 1 dose, Norvasc 5mg PO x 1 dose and ASA 325mg PO x 1 dose.  Patient was straight cathed for urinary retention with ~1L of urine retrieved.  Patient stable, now transferred to [address] for cardiac telemetry, serial cardiac enzymes and further ischemic work-up.      (07 Jan 2021 23:41)     SUBJECTIVE: patient now off restraints, appears in no distress.     ROS:  DYSPNEA: N  NAUS/VOM: N	  SECRETIONS: N	  AGITATION: N  Pain (Y/N):     N  -Provocation/Palliation: N/A  -Quality/Quantity: N/A  -Radiating: N/A  -Severity: N/A  -Timing/Frequency: N/A  -Impact on ADLs: N/A    OTHER REVIEW OF SYSTEMS: unable to obtain   UNABLE TO OBTAIN  due to: Cognitive impairment    PEx:  T(C): 37.1 (01-14-21 @ 05:29), Max: 37.1 (01-14-21 @ 05:29)  HR: 86 (01-14-21 @ 05:29) (78 - 92)  BP: 130/66 (01-14-21 @ 05:29) (130/66 - 159/72)  RR: 17 (01-14-21 @ 05:29) (17 - 18)  SpO2: 95% (01-14-21 @ 05:29) (95% - 98%)  Wt(kg): 70.3kg/m2    GENERAL:  [ x]Alert  [ x]Oriented x 1   [ ]Lethargic  [ ]Cachexia  [ ]Unarousable  [x ]Verbal  [ ]Non-Verbal  Behavioral:   [ ] Anxiety  [ ] Delirium [ ] Agitation [ x] Other - calm  HEENT:  [ ]Normal   [ x]Dry mouth   [ ]ET Tube/Trach  [ ]Oral lesions  PULMONARY:   [x ]Clear  [ ]Tachypnea  [ ]Audible excessive secretions   [ ]Rhonchi        [ ]Right [ ]Left [ ]Bilateral  [ ]Crackles        [ ]Right [ ]Left [ ]Bilateral  [ ]Wheezing     [ ]Right [ ]Left [ ]Bilateral  CARDIOVASCULAR:    [x ]Regular [ ]Irregular [ ]Tachy  [ ]Greg [ ]Murmur [ ]Other  GASTROINTESTINAL:  [x ]Soft  [ ]Distended   [ ]+BS  [x ]Non tender [ ]Tender  [ ]PEG [ ]OGT/ NGT  Last BM:   GENITOURINARY:  [ ]Normal [ ] Incontinent   [ ]Oliguria/Anuria   [x ]Batres  MUSCULOSKELETAL:   [ ]Normal   [x ]Weakness  [ ]Bed/Wheelchair bound [ ]Edema  NEUROLOGIC:   [ ]No focal deficits  [x ] Cognitive impairment  [ ] Dysphagia [ ]Dysarthria [ ] Paresis [ ]Other   SKIN:   [x ]Normal   [ ]Pressure ulcer(s)  [ ]Rash    ALLERGIES: No Known Allergies    OPIATE NAÏVE (Y/N): Y    MEDICATIONS: REVIEWED  MEDICATIONS  (STANDING):  aspirin enteric coated 81 milliGRAM(s) Oral daily  atorvastatin 80 milliGRAM(s) Oral at bedtime  belladonna 16.2 mG/opium 30 mg Suppository 1 Suppository(s) Rectal every 8 hours  finasteride 5 milliGRAM(s) Oral daily  metoprolol tartrate 25 milliGRAM(s) Oral every 12 hours  OLANZapine 2.5 milliGRAM(s) Oral every 24 hours  oxybutynin 5 milliGRAM(s) Oral every 8 hours  tamsulosin 0.4 milliGRAM(s) Oral at bedtime    MEDICATIONS  (PRN):    LABS: REVIEWED  CBC:                        8.8    9.31  )-----------( 176      ( 14 Jan 2021 06:43 )             27.9     CMP:    01-14    145  |  108  |  26<H>  ----------------------------<  108<H>  4.1   |  26  |  1.28    Ca    8.3<L>      14 Jan 2021 06:43  Mg     2.0     01-14    IMAGING: REVIEWED    ADVANCED DIRECTIVES:            FULL CODE          DECISION MAKER:  Patient is able to make decision for herself.  LEGAL SURROGATE:  Divya Gutierrez [phone]    PSYCHOSOCIAL-SPIRITUAL ASSESSMENT:       Reviewed       Care plan unchanged    GOALS OF CARE DISCUSSION       Palliative care info/counseling provided	           Documentation of GOC: Full code   	      AGENCY CHOICE DISCUSSED:                ENOC    REFERRALS	        Unit SW/Case Mgmt       PT/OT

## 2021-01-22 ENCOUNTER — APPOINTMENT (OUTPATIENT)
Dept: HEART AND VASCULAR | Facility: CLINIC | Age: 86
End: 2021-01-22

## 2021-01-26 DIAGNOSIS — S70.11XA CONTUSION OF RIGHT THIGH, INITIAL ENCOUNTER: ICD-10-CM

## 2021-01-26 DIAGNOSIS — F03.91 UNSPECIFIED DEMENTIA WITH BEHAVIORAL DISTURBANCE: ICD-10-CM

## 2021-01-26 DIAGNOSIS — N17.9 ACUTE KIDNEY FAILURE, UNSPECIFIED: ICD-10-CM

## 2021-01-26 DIAGNOSIS — I21.4 NON-ST ELEVATION (NSTEMI) MYOCARDIAL INFARCTION: ICD-10-CM

## 2021-01-26 DIAGNOSIS — D72.829 ELEVATED WHITE BLOOD CELL COUNT, UNSPECIFIED: ICD-10-CM

## 2021-01-26 DIAGNOSIS — R33.9 RETENTION OF URINE, UNSPECIFIED: ICD-10-CM
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
## 2021-01-26 DIAGNOSIS — I10 ESSENTIAL (PRIMARY) HYPERTENSION: ICD-10-CM

## 2021-01-26 DIAGNOSIS — D69.6 THROMBOCYTOPENIA, UNSPECIFIED: ICD-10-CM

## 2021-01-26 DIAGNOSIS — R31.9 HEMATURIA, UNSPECIFIED: ICD-10-CM

## 2021-01-26 DIAGNOSIS — K59.00 CONSTIPATION, UNSPECIFIED: ICD-10-CM
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
## 2021-01-26 DIAGNOSIS — D50.0 IRON DEFICIENCY ANEMIA SECONDARY TO BLOOD LOSS (CHRONIC): ICD-10-CM

## 2021-01-26 DIAGNOSIS — I21.A1 MYOCARDIAL INFARCTION TYPE 2: ICD-10-CM

## 2021-01-26 DIAGNOSIS — D63.8 ANEMIA IN OTHER CHRONIC DISEASES CLASSIFIED ELSEWHERE: ICD-10-CM

## 2021-01-26 DIAGNOSIS — I16.0 HYPERTENSIVE URGENCY: ICD-10-CM

## 2021-03-23 ENCOUNTER — APPOINTMENT (OUTPATIENT)
Dept: UROLOGY | Facility: CLINIC | Age: 86
End: 2021-03-23
Payer: MEDICARE

## 2021-03-23 DIAGNOSIS — R33.9 RETENTION OF URINE, UNSPECIFIED: ICD-10-CM

## 2021-03-23 PROCEDURE — 99213 OFFICE O/P EST LOW 20 MIN: CPT

## 2021-03-23 NOTE — PHYSICAL EXAM
[] : no respiratory distress [Respiration, Rhythm And Depth] : normal respiratory rhythm and effort [Exaggerated Use Of Accessory Muscles For Inspiration] : no accessory muscle use [Abdomen Soft] : soft [Abdomen Tenderness] : non-tender [Costovertebral Angle Tenderness] : no ~M costovertebral angle tenderness [No Prostate Nodules] : no prostate nodules [FreeTextEntry1] : A&0x 0

## 2021-03-23 NOTE — ASSESSMENT
[FreeTextEntry1] : 89 year old male found to be in retention after suffer NSTEMI in January who is extremely poor historian\par -maintain catheter as unaware of his history\par -plan on UDS given retention\par -can continue with Tamsulosin and FInasteride\par

## 2021-03-23 NOTE — HISTORY OF PRESENT ILLNESS
[FreeTextEntry1] : This is an 89 year old male who was brought in by nursing home staff for follow up from his recent admission at [facility] back in January\par Patient is poor historian, does not follow commands in the office.\par  used and patient "not communicating appropriately" as per the .  The aide with the patient states, "she does’t know him at all because he is on a 1:1."\par \par All history was obtained from the H&P at [facility].\par 89 yr old Czech/English speaking M, hx of ETOH abuse, POOR HISTORIAN,\par BIBEMS to [facility] 1/7/20 s/p witnessed fall by bystanders. Patient reportedly was\par walking in the street with his walker when he suddenly collapsed to the ground.\par Patient denied any prodromal dizziness, palpitations, chest pain, SOB,\par headache, visual changes or loss of sensation. Per EMS, no reported LOC and\par unknown if pt struck his head. Patient denies any current pain, only endorsing\par dry mouth and urge to urinate. Pt reportedly lives alone in an apartment and\par denies having ETOH today. Unable to obtain further information as patient\par repeatedly only states "I'm old, I don't know."\par In [facility], BP: 193/109, HR: 73, RR:18, Temp: 98.5F, O2 sat: 98% RA, Fingerstick\par 154. EKG revealed NSR@97BPM with PVC noted, no acute ischemic changes. CXR\par unremarkable. CT head revealed no acute intracranial hemorrhage, mass effect or\par demarcated territorial infarction. Chronic microvessel ischemic changes. Labs\par notable for: Alcohol level <3, WBC 13.95 with slight left shift, Na 148, BUN/Cr\par 52/1.67, Lactate 2.2 with repeat level 1.2 (after IVF bolus), BNP 11,364,\par Initial Troponin I 0.241 and second set 0.395. UA: large blood, >10 RBC,\par bacteria present, negative nitrite/LE. Blood cultures drawn- pending. COVID PCR\par negative.\par Patient treated with 2 liter IVF bolus, Azithromycin 500mg IV x 1 dose,\par Ceftriaxone 1g IV x 1 dose, Norvasc 5mg PO x 1 dose and ASA 325mg PO x 1 dose.\par Patient was straight cathed for urinary retention with ~1L of urine retrieved.\par Patient stable, now transferred to [facility] for cardiac telemetry, serial\par cardiac enzymes and further ischemic work-up.\par \par \par Urology consult notes for hematuria after patient pulled out whitt cathter\par Was irrigated and catheter placed to traction\par \par I spoke with nursing staff at facility\par Catheter was last changed 3/21/21 by the facility\par Urine has been clear yellow.  No fever or chills\par Med rec from NH shows patient is currently on Tamsulosin and Finasteride

## 2021-04-08 ENCOUNTER — APPOINTMENT (OUTPATIENT)
Dept: UROLOGY | Facility: CLINIC | Age: 86
End: 2021-04-08

## 2021-08-27 NOTE — ED ADULT TRIAGE NOTE - WEIGHT IN KG
August 27, 2021       Miriam Arora MD  [address] [address]  Via Fax: [phone]      Patient: Rain Phelps   YOB: 1983   Date of Visit: 8/27/2021       Dear Dr. Arora:    I saw your patient, Lita Phelps, for an evaluation. Below are my notes for this visit with her.    If you have questions, please do not hesitate to call me.      Sincerely,        Axel Ramirez MD        CC: No Recipients  Axel Ramirez MD  8/27/2021  9:21 AM  Signed    Due To COVID 19 Pandemic, our office is trying to take measures to decrease exposure to the high risk population  This patient meets the criteria for either or all of the following reasons:  -age  -co-morbidities  -routine follow up with stable chronic disease and to maintain social distancing precautions per CDC/WHO    The in-person visit was converted to a Video visit   The visit was performed via real-time, 2 way audio visual technology    Clinician Location: Clinic  Patient Location: Home     Pt does not have COVID 19 currently     Reason for video visit: follow up on Type 1 DM    Last In-person Office Visit: 4/6/2021    Rain Phelps is in [place] and identity has been established. I obtained informed consent to proceed with visit and patient agrees for charges to be submitted to their insurance on file. Advised patient that there is an inherent risk to a video visit as assessment may be limited due to the inability to physically examine patient. This could result in inaccurate or incomplete information. I informed patient that either of us can disconnect from the video visit if form of the visit is not appropriate for the patient's situation. Following the completion of the visit, patient verbalized understanding of diagnoses and agreed with the plan of care.           A/P:   Rain Phelps is a 38 year old female w/ no significant PMHx, referred by Dr. Miriam Arora, presenting for f/u of Type 1  DM.    Type 1 DM without complication/Insulin pump in place  Dx ~ 2006, on pump since  Lab Results   Component Value Date    HGBA1C 7.3 (H) 08/17/2021    HGBA1C 7.0 (H) 04/13/2021    CREATININE 0.74 04/13/2021    GFRESTIMATE >90 04/13/2021    TSH 1.673 04/13/2021    MALBCR 9.0 04/13/2021   on Medtronic 670G pump with Novolog and guardian sensor.  CGMS via pump downloaded and reviewed: 4% very high, 17% high, 78% in range, and 1% low . Pt reports that she has her pump in auto mode and states that she likes it that way. Pt reports that she has hyperglycemia in the am even if she does not eat anything. Additionally, she states that she sometimes eats a snack at bedtime.   Pt met with Anu on 4/21, pump settings updated on 5/6 with Anu via telephone. Pt reports Anu advised using Calorie Hunter for carb counting  Due to postprandial hyperglycemia, will adjust carb ratio   Recommend improved carb counting, 10-15g more for foods that cause high blood sugar, less for foods that cause low blood sugar.   Pump settings as under:  Basal  0:00 --- 0.45  03:00 --- 0.55  05:00 --- 0.95  07:00 --- 0.90  12:00 --- 0.80  20:30 --- 0.55  Carb Ratio   0:00 to 6:00 --- 9  6:00 to 12:00 ---8  12:00 to 0:00 --- 10>>>9  Insulin sensitivity --- 50  Target ---   (>>> means changed)    Previously advised to maintain smaller carb meals  Continue checking blood sugar  Check A1c prior to next visit   Follow up in 3 months with blood tests completed one week prior to visit.      HLD  Lab Results   Component Value Date    CHOLESTEROL 201 (H) 04/13/2021    CALCLDL 113 04/13/2021    HDL 75 04/13/2021    TRIGLYCERIDE 65 04/13/2021   Due to elevated LDL will start atorvastatin   START Atorvastatin 10mg daily   Check lipid panel and CMP prior to next visit          Chief Complaint   Patient presents with   • Diabetes Mellitus     EE~09/29/2021  FBS~130 SMBS~5-8*DAILY~PUMP   • Video Visit         HPI:   Rain R Buffo is a 38 year old female w/ no  significant PMHx, referred by Dr. Miriam Arora, presenting for f/u of Type 1 DM. Pt on Medtronic 670G pump with Novolog and guardian sensor. Last A1d was 7.3% on 8/17/2021. CGMS via pump downloaded and reviewed: 4% very high, 17% high, 78% in range, and 1% low . Pt reports that she has her pump in auto mode and states that she likes it that way. Pt reports that she has hyperglycemia in the am even if she does not eat anything. Additionally, she states that she sometimes eats a snack at bedtime. Pt does not report any chest pain, shortness of breath, cough, polyuria, polydipsia, abdominal pain, n/v/c/d, diaphoresis, shakiness, dizziness, blurred vision, or paresthesia. Pt met with Anu on 4/21, pump settings updated on 5/6 with Anu via telephone. Pt reports Anu advised using Calorie Hunter for carb counting.   Pump settings as under:  Basal  0:00 --- 0.45  03:00 --- 0.55  05:00 --- 0.95  07:00 --- 0.90  12:00 --- 0.80  20:30 --- 0.55  Carb Ratio   0:00 --- 9  12:00 --- 10.5  16:00 --- 10  Insulin sensitivity --- 50  Target ---     Pt notes that she is currently having sinus issues. She endorses coughing and congestion. She states that she takes benadryl and flonase to relieve symptoms.         Denies Sx of COVID-19 including fever, cough, SOB, and denies any recent travels. Pt denies having come in contact with anyone who has tested positive. Fully vaccinated.         No components found for: A1C  No components found for: CREAT  Lab Results   Component Value Date    CHOLESTEROL 201 (H) 04/13/2021    CHOLESTEROL 198 12/17/2019    CHOLESTEROL 215 (H) 01/12/2018    HDL 75 04/13/2021    HDL 61 12/17/2019    HDL 68 01/12/2018    CALCLDL 113 04/13/2021    CALCLDL 125 12/17/2019    CALCLDL 133 (H) 01/12/2018     Lab Results   Component Value Date    TSH 1.673 04/13/2021    TSH 1.349 12/17/2019     No results found for: MICALB      No past medical history on file.    No past surgical history on  file.    ALLERGIES:  Penicillins    Current Outpatient Medications   Medication Sig Last Dose   • insulin aspart (NovoLOG) 100 UNIT/ML injectable solution INJECT VIA INSULIN PUMP AS DIRECTED UP TO 50 UNITS    PER DAY Taking at Unknown time   • Insulin Infusion Pump Kit Medtronic Pump Taking at Unknown time   • Multiple Vitamin (QUINTABS) Tab Take 1 tablet by mouth. Taking at Unknown time   • insulin aspart (NOVOLOG) 100 UNIT/ML injectable solution Indications: via insulin pump Inject with pump as directed up to 70u per day Taking at Unknown time   • urine glucose and ketone (KETO-DIASTIX) test strip Use as directed Taking at Unknown time   • blood glucose (MARIS CONTOUR NEXT TEST) test strip Test blood sugar 8 times daily as directed Taking at Unknown time   • atorvastatin (LIPITOR) 10 MG tablet Take 1 tablet by mouth daily.    • insulin aspart (NOVOLOG) 100 UNIT/ML injectable solution       No current facility-administered medications for this visit.       No family history on file.    Social History     Socioeconomic History   • Marital status: /Civil Union     Spouse name: Not on file   • Number of children: Not on file   • Years of education: Not on file   • Highest education level: Not on file   Occupational History   • Not on file   Tobacco Use   • Smoking status: Never Smoker   • Smokeless tobacco: Never Used   Vaping Use   • Vaping Use: never used   Substance and Sexual Activity   • Alcohol use: Not on file   • Drug use: Not on file   • Sexual activity: Not on file   Other Topics Concern   • Not on file   Social History Narrative   • Not on file     Social Determinants of Health     Financial Resource Strain:    • Social Determinants: Financial Resource Strain:    Food Insecurity:    • Social Determinants: Food Insecurity:    Transportation Needs:    • Lack of Transportation (Medical):    • Lack of Transportation (Non-Medical):    Physical Activity:    • Days of Exercise per Week:    • Minutes of  Exercise per Session:    Stress:    • Social Determinants: Stress:    Social Connections:    • Social Determinants: Social Connections:    Intimate Partner Violence:    • Social Determinants: Intimate Partner Violence Past Fear:    • Social Determinants: Intimate Partner Violence Current Fear:      Via video  Review of Systems   Constitutional: Negative for fatigue and fever.   Respiratory: Negative for cough and shortness of breath.    Cardiovascular: Negative for chest pain.   All other systems reviewed and are negative.      Vitals: There were no vitals taken for this visit.     P/E:  Via video  HEENT: EOMI  NECK: No visible thyromegaly  LUNGS: No labored respiration   ABDOMEN: Not distended   MUSCULOSKELETAL: Moves all extremities  SKIN: No rashes.  NERVOUS SYSTEM: awake alert oriented, no focal neurologic deficits.  PSYCHIATRY : Normal judgement and insight       Thank you Dr. Miriam Arora for allowing me to participate in the care of the patient and for consultation. If any of the questions are unanswered please don't hesitate to give me a call.    Axel. FRANTZ Ramirez MD, F.A.C.E;F.A.C.P  Associate Professor of Medicine, [facility]  Staff Endocrinologist, [facility]  [place], [place]    Scribe Attestation: Entered by Rakesh Atwood, acting as scribe for Dr. Ramirez.    Provider Attestation: The documentation recorded by the scribe accurately reflects the service I personally performed and the decisions made by me, Axel Ramirez      Patient was okay with scribe present.                   104.3

## 2023-05-19 NOTE — DISCHARGE NOTE NURSING/CASE MANAGEMENT/SOCIAL WORK - HAS THE PATIENT USED TOBACCO IN THE PAST 30 DAYS?
"PTA medications updated by Medication Scribe prior to surgery via phone call with patient (last doses completed by Nurse)     Medication history sources: Patient, Surescripts and H&P  In the past week, patient estimated taking medication this percent of the time: Greater than 90%      Significant changes made to the medication list:  Patient reports no longer taking the following meds (med scribe removed from PTA med list): Tramadol, Tamsulosin      Additional medication history information:   None    Medication reconciliation completed by provider prior to medication history? No    Time spent in this activity: 25 minutes    The information provided in this note is only as accurate as the sources available at the time of update(s)    Prior to Admission medications    Medication Sig Last Dose Taking? Auth Provider Long Term End Date   acetaminophen (TYLENOL) 500 MG tablet Take 2 tablets (1,000 mg) by mouth every 6 hours as needed for mild pain Unknown at PRN Yes Fei Fuller MD No    amLODIPine (NORVASC) 5 MG tablet Take 1 tablet by mouth every morning  at AM Yes Reported, Patient Yes    buPROPion (WELLBUTRIN XL) 150 MG 24 hr tablet Take 150 mg by mouth every morning  at AM Yes Reported, Patient Yes    clindamycin (CLEOCIN) 300 MG capsule Take 2 capsules by mouth once as needed (1 hour prior to dental appointments 2 x 300 mg = 600 mg) Unknown at PRN Yes Reported, Patient     cyanocobalamin (CYANOCOBALAMIN) 1000 MCG/ML injection Inject 1 mL (1,000 mcg) into the muscle every 14 days  at AM Yes Reported, Patient     Ferrous Sulfate (IRON) 325 (65 Fe) MG tablet Take 1 tablet by mouth daily (with breakfast)  at AM Yes Reported, Patient     FLUoxetine 20 MG tablet Take 3 tablets by mouth every morning (3 x 20 mg = 60 mg)  at AM Yes Reported, Patient     potassium citrate (UROCIT-K) 10 MEQ (1080 MG) CR tablet Take 1 tablet by mouth 2 times daily  at AM Yes Reported, Patient     B-D LUER-ANITA SYRINGE 25G X 5/8\" 3 ML MISC " USE 1 SYRINGE MONTHLY FOR B12 INJECTION   Reported, Patient       Medication history completed by:    Regulo Hurtado CPhT  Medication [facility]      No

## 2023-12-21 NOTE — ED PROVIDER NOTE - SECONDARY DIAGNOSIS.
[FreeTextEntry1] : Patient presents to the office today to arrange for colonoscopy and upper endoscopy his last examination was performed about 4 years ago.  Patient has chronic reflux symptomatology and history of adenomatous polyps he looks and feels well at this time and has been recently seen by primary care physician as well as cardiology and echocardiogram have been performed and patient was reassured.  The patient will have procedure performed by Dr. Coffman who had performed the procedures in the past.  The risks benefits alternatives and limitations were discussed. Alcoholic intoxication without complication